# Patient Record
Sex: MALE | Race: WHITE | Employment: FULL TIME | URBAN - NONMETROPOLITAN AREA
[De-identification: names, ages, dates, MRNs, and addresses within clinical notes are randomized per-mention and may not be internally consistent; named-entity substitution may affect disease eponyms.]

---

## 2020-02-04 ENCOUNTER — HOSPITAL ENCOUNTER (INPATIENT)
Age: 40
LOS: 3 days | Discharge: HOME | DRG: 897 | End: 2020-02-08
Attending: INTERNAL MEDICINE | Admitting: INTERNAL MEDICINE
Payer: COMMERCIAL

## 2020-02-04 LAB
ACETAMINOPHEN LEVEL: < 5 UG/ML (ref 0–20)
ALBUMIN SERPL-MCNC: 4.7 G/DL (ref 3.5–5.1)
ALP BLD-CCNC: 88 U/L (ref 38–126)
ALT SERPL-CCNC: 150 U/L (ref 11–66)
AMPHETAMINE+METHAMPHETAMINE URINE SCREEN: NEGATIVE
ANION GAP SERPL CALCULATED.3IONS-SCNC: 17 MEQ/L (ref 8–16)
AST SERPL-CCNC: 231 U/L (ref 5–40)
BARBITURATE QUANTITATIVE URINE: NEGATIVE
BASOPHILS # BLD: 0.5 %
BASOPHILS ABSOLUTE: 0 THOU/MM3 (ref 0–0.1)
BENZODIAZEPINE QUANTITATIVE URINE: NEGATIVE
BILIRUB SERPL-MCNC: 0.9 MG/DL (ref 0.3–1.2)
BUN BLDV-MCNC: 8 MG/DL (ref 7–22)
CALCIUM SERPL-MCNC: 9.2 MG/DL (ref 8.5–10.5)
CANNABINOID QUANTITATIVE URINE: NEGATIVE
CHLORIDE BLD-SCNC: 98 MEQ/L (ref 98–111)
CO2: 24 MEQ/L (ref 23–33)
COCAINE METABOLITE QUANTITATIVE URINE: NEGATIVE
CREAT SERPL-MCNC: 0.7 MG/DL (ref 0.4–1.2)
EOSINOPHIL # BLD: 2.3 %
EOSINOPHILS ABSOLUTE: 0.1 THOU/MM3 (ref 0–0.4)
ERYTHROCYTE [DISTWIDTH] IN BLOOD BY AUTOMATED COUNT: 12.7 % (ref 11.5–14.5)
ERYTHROCYTE [DISTWIDTH] IN BLOOD BY AUTOMATED COUNT: 45.4 FL (ref 35–45)
ETHYL ALCOHOL, SERUM: 0.31 %
GFR SERPL CREATININE-BSD FRML MDRD: > 90 ML/MIN/1.73M2
GLUCOSE BLD-MCNC: 103 MG/DL (ref 70–108)
HCT VFR BLD CALC: 49.7 % (ref 42–52)
HEMOGLOBIN: 17 GM/DL (ref 14–18)
IMMATURE GRANS (ABS): 0.01 THOU/MM3 (ref 0–0.07)
IMMATURE GRANULOCYTES: 0.2 %
LYMPHOCYTES # BLD: 66.3 %
LYMPHOCYTES ABSOLUTE: 3.7 THOU/MM3 (ref 1–4.8)
MCH RBC QN AUTO: 33.2 PG (ref 26–33)
MCHC RBC AUTO-ENTMCNC: 34.2 GM/DL (ref 32.2–35.5)
MCV RBC AUTO: 97.1 FL (ref 80–94)
MONOCYTES # BLD: 7.2 %
MONOCYTES ABSOLUTE: 0.4 THOU/MM3 (ref 0.4–1.3)
NUCLEATED RED BLOOD CELLS: 0 /100 WBC
OPIATES, URINE: NEGATIVE
OSMOLALITY CALCULATION: 276.1 MOSMOL/KG (ref 275–300)
OXYCODONE: NEGATIVE
PHENCYCLIDINE QUANTITATIVE URINE: NEGATIVE
PLATELET # BLD: 149 THOU/MM3 (ref 130–400)
PMV BLD AUTO: 10.2 FL (ref 9.4–12.4)
POTASSIUM SERPL-SCNC: 3.8 MEQ/L (ref 3.5–5.2)
RBC # BLD: 5.12 MILL/MM3 (ref 4.7–6.1)
SALICYLATE, SERUM: < 0.3 MG/DL (ref 2–10)
SEG NEUTROPHILS: 23.5 %
SEGMENTED NEUTROPHILS ABSOLUTE COUNT: 1.3 THOU/MM3 (ref 1.8–7.7)
SODIUM BLD-SCNC: 139 MEQ/L (ref 135–145)
TOTAL PROTEIN: 8.8 G/DL (ref 6.1–8)
TSH SERPL DL<=0.05 MIU/L-ACNC: 0.99 UIU/ML (ref 0.4–4.2)
WBC # BLD: 5.6 THOU/MM3 (ref 4.8–10.8)

## 2020-02-04 PROCEDURE — 84443 ASSAY THYROID STIM HORMONE: CPT

## 2020-02-04 PROCEDURE — 36415 COLL VENOUS BLD VENIPUNCTURE: CPT

## 2020-02-04 PROCEDURE — 80053 COMPREHEN METABOLIC PANEL: CPT

## 2020-02-04 PROCEDURE — 93005 ELECTROCARDIOGRAM TRACING: CPT | Performed by: INTERNAL MEDICINE

## 2020-02-04 PROCEDURE — 85025 COMPLETE CBC W/AUTO DIFF WBC: CPT

## 2020-02-04 PROCEDURE — 99285 EMERGENCY DEPT VISIT HI MDM: CPT

## 2020-02-04 PROCEDURE — G0480 DRUG TEST DEF 1-7 CLASSES: HCPCS

## 2020-02-04 PROCEDURE — 80307 DRUG TEST PRSMV CHEM ANLYZR: CPT

## 2020-02-05 PROBLEM — F10.929 ALCOHOL INTOXICATION (HCC): Status: ACTIVE | Noted: 2020-02-05

## 2020-02-05 LAB
EKG ATRIAL RATE: 81 BPM
EKG P AXIS: 76 DEGREES
EKG P-R INTERVAL: 132 MS
EKG Q-T INTERVAL: 370 MS
EKG QRS DURATION: 90 MS
EKG QTC CALCULATION (BAZETT): 429 MS
EKG R AXIS: 92 DEGREES
EKG T AXIS: 72 DEGREES
EKG VENTRICULAR RATE: 81 BPM
ETHYL ALCOHOL, SERUM: 0.06 %
MAGNESIUM: 1.8 MG/DL (ref 1.6–2.4)

## 2020-02-05 PROCEDURE — 1200000003 HC TELEMETRY R&B

## 2020-02-05 PROCEDURE — 2580000003 HC RX 258: Performed by: INTERNAL MEDICINE

## 2020-02-05 PROCEDURE — 6370000000 HC RX 637 (ALT 250 FOR IP): Performed by: EMERGENCY MEDICINE

## 2020-02-05 PROCEDURE — 36415 COLL VENOUS BLD VENIPUNCTURE: CPT

## 2020-02-05 PROCEDURE — 6360000002 HC RX W HCPCS: Performed by: EMERGENCY MEDICINE

## 2020-02-05 PROCEDURE — 99223 1ST HOSP IP/OBS HIGH 75: CPT | Performed by: INTERNAL MEDICINE

## 2020-02-05 PROCEDURE — 2580000003 HC RX 258: Performed by: EMERGENCY MEDICINE

## 2020-02-05 PROCEDURE — 83735 ASSAY OF MAGNESIUM: CPT

## 2020-02-05 PROCEDURE — 6360000002 HC RX W HCPCS: Performed by: INTERNAL MEDICINE

## 2020-02-05 PROCEDURE — 2500000003 HC RX 250 WO HCPCS: Performed by: EMERGENCY MEDICINE

## 2020-02-05 PROCEDURE — 6370000000 HC RX 637 (ALT 250 FOR IP): Performed by: INTERNAL MEDICINE

## 2020-02-05 PROCEDURE — 94760 N-INVAS EAR/PLS OXIMETRY 1: CPT

## 2020-02-05 PROCEDURE — 93005 ELECTROCARDIOGRAM TRACING: CPT | Performed by: EMERGENCY MEDICINE

## 2020-02-05 PROCEDURE — G0480 DRUG TEST DEF 1-7 CLASSES: HCPCS

## 2020-02-05 PROCEDURE — 2709999900 HC NON-CHARGEABLE SUPPLY

## 2020-02-05 RX ORDER — PHENOBARBITAL 32.4 MG/1
64.8 TABLET ORAL 2 TIMES DAILY
Status: COMPLETED | OUTPATIENT
Start: 2020-02-06 | End: 2020-02-06

## 2020-02-05 RX ORDER — ONDANSETRON 2 MG/ML
4 INJECTION INTRAMUSCULAR; INTRAVENOUS EVERY 6 HOURS PRN
Status: DISCONTINUED | OUTPATIENT
Start: 2020-02-05 | End: 2020-02-08 | Stop reason: HOSPADM

## 2020-02-05 RX ORDER — PHENOBARBITAL 32.4 MG/1
32.4 TABLET ORAL 2 TIMES DAILY
Status: COMPLETED | OUTPATIENT
Start: 2020-02-07 | End: 2020-02-08

## 2020-02-05 RX ORDER — THIAMINE MONONITRATE (VIT B1) 100 MG
100 TABLET ORAL DAILY
Status: DISCONTINUED | OUTPATIENT
Start: 2020-02-05 | End: 2020-02-08 | Stop reason: HOSPADM

## 2020-02-05 RX ORDER — MULTIVITAMIN WITH FOLIC ACID 400 MCG
1 TABLET ORAL DAILY
Status: DISCONTINUED | OUTPATIENT
Start: 2020-02-05 | End: 2020-02-08 | Stop reason: HOSPADM

## 2020-02-05 RX ORDER — FOLIC ACID 1 MG/1
1 TABLET ORAL DAILY
Status: DISCONTINUED | OUTPATIENT
Start: 2020-02-05 | End: 2020-02-08 | Stop reason: HOSPADM

## 2020-02-05 RX ORDER — SODIUM CHLORIDE 0.9 % (FLUSH) 0.9 %
10 SYRINGE (ML) INJECTION EVERY 12 HOURS SCHEDULED
Status: DISCONTINUED | OUTPATIENT
Start: 2020-02-05 | End: 2020-02-08 | Stop reason: HOSPADM

## 2020-02-05 RX ORDER — SODIUM CHLORIDE 9 MG/ML
INJECTION, SOLUTION INTRAVENOUS CONTINUOUS
Status: DISCONTINUED | OUTPATIENT
Start: 2020-02-05 | End: 2020-02-06

## 2020-02-05 RX ORDER — SODIUM CHLORIDE 0.9 % (FLUSH) 0.9 %
10 SYRINGE (ML) INJECTION PRN
Status: DISCONTINUED | OUTPATIENT
Start: 2020-02-05 | End: 2020-02-08 | Stop reason: HOSPADM

## 2020-02-05 RX ORDER — LORAZEPAM 1 MG/1
1 TABLET ORAL ONCE
Status: COMPLETED | OUTPATIENT
Start: 2020-02-05 | End: 2020-02-05

## 2020-02-05 RX ADMIN — PHENOBARBITAL SODIUM 291.85 MG: 65 INJECTION INTRAMUSCULAR; INTRAVENOUS at 23:05

## 2020-02-05 RX ADMIN — THERA TABS 1 TABLET: TAB at 16:39

## 2020-02-05 RX ADMIN — Medication 100 MG: at 16:39

## 2020-02-05 RX ADMIN — FOLIC ACID 1 MG: 1 TABLET ORAL at 16:39

## 2020-02-05 RX ADMIN — FOLIC ACID: 5 INJECTION, SOLUTION INTRAMUSCULAR; INTRAVENOUS; SUBCUTANEOUS at 10:57

## 2020-02-05 RX ADMIN — PHENOBARBITAL SODIUM 291.85 MG: 65 INJECTION INTRAMUSCULAR; INTRAVENOUS at 16:42

## 2020-02-05 RX ADMIN — LORAZEPAM 1 MG: 1 TABLET ORAL at 09:59

## 2020-02-05 ASSESSMENT — SLEEP AND FATIGUE QUESTIONNAIRES
SLEEP PATTERN: DIFFICULTY FALLING ASLEEP
DIFFICULTY STAYING ASLEEP: YES
AVERAGE NUMBER OF SLEEP HOURS: 4
DIFFICULTY FALLING ASLEEP: YES
RESTFUL SLEEP: YES
DO YOU HAVE DIFFICULTY SLEEPING: YES
DO YOU USE A SLEEP AID: NO
DIFFICULTY ARISING: NO

## 2020-02-05 ASSESSMENT — PAIN SCALES - GENERAL
PAINLEVEL_OUTOF10: 0
PAINLEVEL_OUTOF10: 0

## 2020-02-05 NOTE — ED PROVIDER NOTES
Patient was seen and evaluated in the emergency department, patient appeared to be in no acute distress. Patient was seen and evaluated previously by Magalie Buckner CNP, from whom I took over this case and patient care. I have personally performed and/or participated in the history, exam and medical decision making and agree with all pertinent clinical information as noted by the previous provider. I have also reviewed and agree with the past medical, family and social history unless otherwise noted. I have personally performed a face-to-face diagnostic evaluation on this patient. I have reviewed the previous provider's orders for imaging and labs as well as the previous provider's documentation. 0600 assumed patient care at this time due to shift change. Patient presented to the emergency department complaining of suicidal thoughts. Patient states that he has a plan but would not tell staff members. Patient was intoxicated with alcohol at the time of the initial evaluation. The patient is pending an alcohol redraw at 8:00 AM this morning. After sobriety is achieved to be re-evaluated by behavioral access team members. Patient remains in the safe room area for observation. The patient's alcohol redraw was 0.06%. Patient was reevaluated. The patient is becoming shaky, tremorous. Patient states that he has not stopped drinking alcohol since June of last year. Patient states that he drinks at least 1 L of vodka per day. Patient states that he has had withdrawal symptoms when trying to stop drinking alcohol in the past.    Patient was treated with Ativan 1 mg oral.  He was advised to be brought to the medical room, placed on the heart monitor and IV started. An EKG was performed. Normal sinus rhythm with no acute changes. Patient is monitored with telemetry. IV fluids with multivitamin, thiamine was initiated. I contacted the hospitalist due to the patient's withdrawal symptoms.   The

## 2020-02-05 NOTE — H&P
[facility]) [F10.929] 02/05/2020    Alcohol withdrawal syndrome with complication ([facility] [R94.061] 02/05/2020    Suicidal behavior without attempted self-injury [R46.89] 02/05/2020    Alcohol withdrawal syndrome, uncomplicated ([facility] [D98.985] 02/05/2020       PLAN:    1. Alcohol Withdrawal, without Delirium: phenobarbital protocol. Folic Acid, Thiamine, Daily multivitamin. IV fluids. 2. Suicidal Behavior: sitter at bedside. Consult Psychiatry. Thank you No primary care provider on file. for the opportunity to be involved in this patient's care.     Electronically signed by Edith Morgan MD on 2/5/2020 at 10:39 AM

## 2020-02-05 NOTE — ED NOTES
ED nurse-to-nurse bedside report    Chief Complaint   Patient presents with    Suicidal      LOC: alert and orientated to name, place, date  Vital signs   Vitals:    02/05/20 0402 02/05/20 0817 02/05/20 0955 02/05/20 1037   BP: (!) 157/95 (!) 166/94 (!) 166/99    Pulse: 84 100 87 88   Resp: 16 18 18 22   Temp:       TempSrc:       SpO2: 94% 95% 94% 96%   Weight:    130 lb (59 kg)      Pain:    Pain Interventions: none  Pain Goal: 0  Oxygen: No    Current needs required none   Telemetry: Yes  LDAs:   Peripheral IV 02/05/20 Left Antecubital (Active)   Site Assessment Clean;Dry; Intact 2/5/2020 10:30 AM   Dressing Status Clean;Dry; Intact 2/5/2020 10:30 AM     Continuous Infusions:   Mobility: Independent  Staples Fall Risk Score: No flowsheet data found. Fall Interventions:   Report given to: [facility], RN    Sitter at bedside.         Abdiaziz Harris RN  02/05/20 2997

## 2020-02-05 NOTE — ED NOTES
Upon first contact with patient this RN receives bedside shift report from [employer]. Patient resting in bed with eyes closed.       Raeann Ye RN  02/05/20 8644

## 2020-02-05 NOTE — ED NOTES
Pt resting quietly in room no needs expressed. Pt in safe room in a ligature resistant environment. Pt has continuous campus police supervision in safe room. Will continue to monitor.        Leydi Estrella RN  02/05/20 5371

## 2020-02-05 NOTE — PROGRESS NOTES
[facility] Team notified of results of the C-SSRS screening and the need for further evaluation of patient. Discussed suicide precautions with patient before implementation. Patient notified that they will be observed at all times, including toileting/bathing. Visitors will be screened and may be limited at the discretion of the nurse. Visitor belongings are subject to be searched. Belongings may not be allowed into the patient room. Personal belongings checked by Gregorio Metzger RN and Marely Lopez in the presence of the patient. Belongings believed to be a threat to patient safety removed from room. Belongings removed include: hat, knife, pills, eye drops, wallet, keys, cigarettes, cell phone  Placed in secure area in cabinet in room. [facility] Room screened for safety, items removed to create a safe environment include:   Blood pressure cuff   Loose or extra cords, tubing (not of medical necessity)   Extra Linens   Telephone   Toiletries   Trash liners   Patient's cell phone and charging cord (must be removed from room)       Safety tray ordered: yes    Expectations were discussed with sitter (unit support aide). Sitter positioned near exit. Sitter reminded that patient should be observed at all times including toileting and bathing. Sitter has security radio and documentation sheet. Patient and sitter included in hourly rounds.

## 2020-02-05 NOTE — PROGRESS NOTES
who requests that we hold off on calling psychiatry as he would like to be sure patient is medically cleared and not needing admission to a medical unit. Will await word from Hamzah before proceeding. Updated Level of Care Disposition:      Patient care handover to [facility] clinician [address] for final disposition.

## 2020-02-05 NOTE — ED NOTES
Patient resting in bed. Respirations easy and unlabored. No distress noted. Suicide precautions maintained. Breakfast provided to patient.       Ghazala Bello RN  02/05/20 3637

## 2020-02-05 NOTE — ED NOTES
Patient transported to [facility]  in stable condition. [place] police to escort.          Lexis Baer, SAUL  85/11/25 4348

## 2020-02-05 NOTE — ED PROVIDER NOTES
[facility] EMERGENCY DEPT      CHIEF COMPLAINT       Chief Complaint   Patient presents with    Suicidal       Nurses Notes reviewed and I agree except as noted in the HPI. HISTORY OF PRESENT ILLNESS    Dwight Gutierrez is a 46 y.o. male who presents for Depression with suicidal ideation. Patient states he has been severely depressed for the past few days and is scared he is going to hurt himself. He has a plan but would not discuss it with me. He has had previous attempts. He denies visual or auditory hallucinations. He denies head injury. He has had a mild headache for the past few days. He does not eat well but drinks lots of water. He endorses smoking two packs of cigarettes a day, drinking a liter of vodka a day, but denies drug use. He has had withdrawal symptoms in the past consisting of tremors but no seizures. REVIEW OF SYSTEMS     Review of Systems   Constitutional: Negative for appetite change and fever. HENT: Negative for congestion. Eyes: Negative for photophobia. Respiratory: Negative for cough and shortness of breath. Cardiovascular: Negative for chest pain. Gastrointestinal: Negative for abdominal pain, nausea and vomiting. Genitourinary: Negative for difficulty urinating. Musculoskeletal: Negative for gait problem. Skin: Negative for rash and wound. Neurological: Positive for headaches. Psychiatric/Behavioral: Positive for dysphoric mood and suicidal ideas. Negative for hallucinations. PAST MEDICAL HISTORY    has a past medical history of Depressed and Panic disorder with agoraphobia. SURGICAL HISTORY      has a past surgical history that includes Mouth surgery (Bilateral, 10/2008).     CURRENT MEDICATIONS       Previous Medications    DIAZEPAM (VALIUM) 10 MG TABLET    Take 1 tablet by mouth every 6 hours as needed for Anxiety (withdrawal symptoms)    ONDANSETRON (ZOFRAN ODT) 4 MG DISINTEGRATING TABLET    Take 1 tablet by mouth every 8 hours as needed for Nausea or Vomiting       ALLERGIES     has No Known Allergies. FAMILY HISTORY     He indicated that his mother is alive. He indicated that his father is alive. He indicated that the status of his maternal grandmother is unknown. He indicated that the status of his maternal uncle is unknown.   family history includes Alcohol Abuse in his father; Cancer in his maternal grandmother; High Blood Pressure in his maternal uncle; High Cholesterol in his maternal uncle; Substance Abuse in his father. SOCIAL HISTORY    reports that he has been smoking cigarettes. He has a 40.00 pack-year smoking history. He does not have any smokeless tobacco history on file. He reports current alcohol use. He reports that he does not use drugs. PHYSICAL EXAM     INITIAL VITALS:  oral temperature is 98.5 °F (36.9 °C). His blood pressure is 177/105 (abnormal) and his pulse is 90. His respiration is 18 and oxygen saturation is 93%. Physical Exam  Vitals signs and nursing note reviewed. Constitutional:       General: He is not in acute distress. Appearance: He is well-developed. He is not toxic-appearing or diaphoretic. HENT:      Head: Normocephalic and atraumatic. Right Ear: Hearing normal.      Left Ear: Hearing normal.      Nose: Nose normal. No rhinorrhea. Mouth/Throat:      Pharynx: Uvula midline. No oropharyngeal exudate. Eyes:      General: Lids are normal. No scleral icterus. Conjunctiva/sclera:      Right eye: Right conjunctiva is injected. Left eye: Left conjunctiva is injected. Pupils: Pupils are equal, round, and reactive to light. Neck:      Musculoskeletal: Normal range of motion and neck supple. No neck rigidity. Trachea: No tracheal deviation. Cardiovascular:      Rate and Rhythm: Normal rate and regular rhythm. Heart sounds: Normal heart sounds. No murmur. Pulmonary:      Effort: Pulmonary effort is normal. No respiratory distress.       Breath sounds: Normal breath sounds. No stridor. No decreased breath sounds or wheezing. Abdominal:      General: There is no distension. Palpations: Abdomen is soft. Abdomen is not rigid. Tenderness: There is no abdominal tenderness. There is no guarding. Musculoskeletal: Normal range of motion. Lymphadenopathy:      Cervical: No cervical adenopathy. Skin:     General: Skin is warm and dry. Coloration: Skin is not pale. Findings: No rash. Neurological:      Mental Status: He is alert and oriented to person, place, and time. GCS: GCS eye subscore is 4. GCS verbal subscore is 5. GCS motor subscore is 6. Gait: Gait normal.      Comments: No gross deficits observed   Psychiatric:         Attention and Perception: Attention normal.         Speech: Speech normal.         Behavior: Behavior normal.         Thought Content: Thought content includes suicidal ideation. Thought content includes suicidal plan. Cognition and Memory: Memory normal.      Comments: The patient is tearful         DIFFERENTIAL DIAGNOSIS:   Including but not limited to: Suicidal ideation, depression, alcoholism, alcohol intoxication, metabolic derangement, bipolar disorder    DIAGNOSTIC RESULTS     EKG: All EKG's are [facility] who either signs or Co-signs this chart in the absence of a cardiologist.  Normal sinus rhythm at 81 bpm, NV interval 132, QRS duration 90, QTc 429, P-R-T axes 76, 92, and 72. No STEMI. RADIOLOGY: non-plain film images(s) such as CT,Ultrasound and MRI are read by the radiologist.  Plain radiographic images are visualized and preliminarily interpreted by the emergency physician unless otherwise stated below.   No orders to display       LABS:   Labs Reviewed   CBC WITH AUTO DIFFERENTIAL - Abnormal; Notable for the following components:       Result Value    MCV 97.1 (*)     MCH 33.2 (*)     RDW-SD 45.4 (*)     Segs Absolute 1.3 (*)     All other components within normal limits   COMPREHENSIVE METABOLIC PANEL - Abnormal; Notable for the following components:     (*)     Total Protein 8.8 (*)      (*)     All other components within normal limits   SALICYLATE LEVEL - Abnormal; Notable for the following components:    Salicylate, Serum < 0.3 (*)     All other components within normal limits   ANION GAP - Abnormal; Notable for the following components:    Anion Gap 17.0 (*)     All other components within normal limits   TSH WITH REFLEX   ETHANOL   URINE DRUG SCREEN   ACETAMINOPHEN LEVEL   GLOMERULAR FILTRATION RATE, ESTIMATED   OSMOLALITY       EMERGENCY DEPARTMENT COURSE:   Vitals:    Vitals:    02/04/20 2123   BP: (!) 177/105   Pulse: 90   Resp: 18   Temp: 98.5 °F (36.9 °C)   TempSrc: Oral   SpO2: 93%         MDM:  The patient was seen by me in the emergency department for depression with suicidal ideation. The patient was found intoxicated but cooperative. Physical exam was otherwise benign. Appropriate labs were ordered. Vital signs noted patient to be hypertensive but were otherwise stable. Labs reflected elevated liver enzymes and a blood alcohol of .31. Patients redraw time was estimated at 8 AM. I discussed this patient with shabbir from [facility]. Patient was turned over to Hugo Medina NP at change of shift pending sobriety and evaluation by RACHELLE. Disposition most likely will be admission. CRITICAL CARE:   None    CONSULTS:  Shabbir from [facility]    PROCEDURES:  None    FINAL IMPRESSION      1. Acute alcoholic intoxication without complication ([address]    2. Depression with suicidal ideation    3. Elevated liver function tests          DISPOSITION/PLAN     1. Acute alcoholic intoxication without complication ([address]    2. Depression with suicidal ideation    3.  Elevated liver function tests    Pending      (Please note that portions of this note were completed with a voice recognition program.  Efforts were made to edit the dictations but occasionally words are

## 2020-02-05 NOTE — ED NOTES
Pt given turkey sandwich upon request. Pt denies needs at this time. Will continue to monitor.      Tito Dance  02/04/20 0079

## 2020-02-05 NOTE — ED NOTES
Pt in safe room in a ligature resistant environment. Pt resting with eyes closed. Pt has continuous campus police supervision in safe room.        Inga Mccollum RN  02/05/20 7879

## 2020-02-06 LAB
BACTERIA: ABNORMAL
BILIRUBIN URINE: NEGATIVE
BLOOD, URINE: NEGATIVE
CASTS: ABNORMAL /LPF
CASTS: ABNORMAL /LPF
CHARACTER, URINE: CLEAR
COLOR: ABNORMAL
CRYSTALS: ABNORMAL
EKG ATRIAL RATE: 71 BPM
EKG P AXIS: 38 DEGREES
EKG P-R INTERVAL: 120 MS
EKG Q-T INTERVAL: 384 MS
EKG QRS DURATION: 88 MS
EKG QTC CALCULATION (BAZETT): 417 MS
EKG R AXIS: 86 DEGREES
EKG T AXIS: 77 DEGREES
EKG VENTRICULAR RATE: 71 BPM
EPITHELIAL CELLS, UA: ABNORMAL /HPF
GLUCOSE, URINE: NEGATIVE MG/DL
KETONES, URINE: ABNORMAL
LEUKOCYTE ESTERASE, URINE: NEGATIVE
MISCELLANEOUS LAB TEST RESULT: ABNORMAL
NITRITE, URINE: NEGATIVE
PH UA: 8.5 (ref 5–9)
PROTEIN UA: NEGATIVE MG/DL
RBC URINE: ABNORMAL /HPF
RENAL EPITHELIAL, UA: ABNORMAL
SPECIFIC GRAVITY UA: 1.02 (ref 1–1.03)
UROBILINOGEN, URINE: 1 EU/DL (ref 0–1)
WBC UA: ABNORMAL /HPF
YEAST: ABNORMAL

## 2020-02-06 PROCEDURE — 1200000003 HC TELEMETRY R&B

## 2020-02-06 PROCEDURE — 6370000000 HC RX 637 (ALT 250 FOR IP): Performed by: INTERNAL MEDICINE

## 2020-02-06 PROCEDURE — 6360000002 HC RX W HCPCS: Performed by: INTERNAL MEDICINE

## 2020-02-06 PROCEDURE — 2709999900 HC NON-CHARGEABLE SUPPLY

## 2020-02-06 PROCEDURE — 2580000003 HC RX 258: Performed by: INTERNAL MEDICINE

## 2020-02-06 PROCEDURE — 90792 PSYCH DIAG EVAL W/MED SRVCS: CPT | Performed by: PSYCHIATRY & NEUROLOGY

## 2020-02-06 PROCEDURE — 81001 URINALYSIS AUTO W/SCOPE: CPT

## 2020-02-06 PROCEDURE — 99232 SBSQ HOSP IP/OBS MODERATE 35: CPT | Performed by: INTERNAL MEDICINE

## 2020-02-06 RX ORDER — SODIUM CHLORIDE 9 MG/ML
INJECTION, SOLUTION INTRAVENOUS CONTINUOUS
Status: DISCONTINUED | OUTPATIENT
Start: 2020-02-06 | End: 2020-02-06

## 2020-02-06 RX ORDER — NICOTINE 21 MG/24HR
1 PATCH, TRANSDERMAL 24 HOURS TRANSDERMAL DAILY
Status: DISCONTINUED | OUTPATIENT
Start: 2020-02-06 | End: 2020-02-06

## 2020-02-06 RX ADMIN — PHENOBARBITAL SODIUM 291.85 MG: 65 INJECTION INTRAMUSCULAR; INTRAVENOUS at 03:45

## 2020-02-06 RX ADMIN — PHENOBARBITAL 64.8 MG: 32.4 TABLET ORAL at 09:35

## 2020-02-06 RX ADMIN — Medication 1 PUFF: at 21:58

## 2020-02-06 RX ADMIN — SODIUM CHLORIDE: 9 INJECTION, SOLUTION INTRAVENOUS at 09:35

## 2020-02-06 RX ADMIN — SODIUM CHLORIDE: 9 INJECTION, SOLUTION INTRAVENOUS at 02:52

## 2020-02-06 RX ADMIN — FOLIC ACID 1 MG: 1 TABLET ORAL at 09:35

## 2020-02-06 RX ADMIN — Medication 100 MG: at 09:35

## 2020-02-06 RX ADMIN — Medication 1 PUFF: at 17:29

## 2020-02-06 RX ADMIN — THERA TABS 1 TABLET: TAB at 09:35

## 2020-02-06 RX ADMIN — Medication 1 PUFF: at 15:14

## 2020-02-06 RX ADMIN — PHENOBARBITAL 64.8 MG: 32.4 TABLET ORAL at 21:57

## 2020-02-06 RX ADMIN — SODIUM CHLORIDE, PRESERVATIVE FREE 10 ML: 5 INJECTION INTRAVENOUS at 21:58

## 2020-02-06 ASSESSMENT — ENCOUNTER SYMPTOMS
NAUSEA: 0
SHORTNESS OF BREATH: 0
PHOTOPHOBIA: 0
ABDOMINAL PAIN: 0
VOMITING: 0
COUGH: 0

## 2020-02-06 ASSESSMENT — PAIN SCALES - GENERAL
PAINLEVEL_OUTOF10: 0

## 2020-02-06 NOTE — PROGRESS NOTES
Hospitalist Progress Note    Patient:  Daev Romero  YOB: 1980  MRN: [id number]   PCP: No primary care provider on file. Acct: [de-identified]  Unit/Bed: 8B-27/027-A    Date of Admission: 2/4/2020      ASSESSMENT     1. Suicidal ideation in setting of chronic depression: Currently has sitter at bedside. Psychiatry consult pending  2. Uncomplicated alcohol withdrawal in setting of history of chronic alcoholism: Phenobarbital protocol. [employer]  3. Volume depletion: IV fluids  4. Panic disorder    PLAN     1. Psychiatry to evaluate. Patient currently denies further suicidal ideation,  2. Continue phenobarbital protocol  3. Continue to monitor    Anticipated Discharge in : after phenobarbital protocol completed     Code Status: Full Code    Electronically signed by Jose Luis Pierce MD on 2/6/2020 at 1:21 PM      Chief Complaint     Suicidal ideation. SUBJECTIVE     The patient is a 46 y.o.. male w/ a history of chronic depression and chronic alcoholism who was admitted for suicidal ideation.    - Patient currently denies suicidal ideation.  No other acute issues      OBJECTIVE     Medications:  Reviewed    Infusion Medications   Scheduled Medications    sodium chloride flush  10 mL Intravenous 2 times per day    enoxaparin  40 mg Subcutaneous Daily    folic acid  1 mg Oral Daily    thiamine  100 mg Oral Daily    multivitamin  1 tablet Oral Daily    PHENobarbital  64.8 mg Oral BID    Followed by   Christina Avalos ON 2/7/2020] PHENobarbital  32.4 mg Oral BID     PRN Meds: nicotine, sodium chloride flush, magnesium hydroxide, ondansetron    Ins and outs:      Intake/Output Summary (Last 24 hours) at 2/6/2020 1321  Last data filed at 2/6/2020 0939  Gross per 24 hour   Intake 2678.34 ml   Output 650 ml   Net 2028.34 ml       Physical Examination     BP (!) 159/97   Pulse 80   Temp 98.1 °F (36.7 °C) (Oral)   Resp 16   Ht 5' 10\" (1.778 m)   Wt 136 lb 1.6 oz (61.7 kg)   SpO2 97%   BMI 19.53 kg/m² General appearance: No apparent distress. HEENT: Extraocular motion intact. Neck: Supple  Respiratory:  CTA bilaterally without rales/wheezes/rhonchi. Cardiovascular: RRR with normal S1/S2 without murmurs, rubs or gallops. Abdomen: Soft, non-tender, non-distended with normal bowel sounds. Musculoskeletal: Patient is moving extremities x 4 spontaneously  Neurologic: Grossly non focal. CN: II-XII intact  Psychiatric: Alert and oriented  Vascular: Dorsalis pedis palpable bilaterally. Radial pulses palpable bilaterally. No peripheral edema   Skin:  No visible rashes or lesions. Labs     Recent Labs     02/04/20 2149   WBC 5.6   HGB 17.0   HCT 49.7        Recent Labs     02/04/20 2149      K 3.8   CL 98   CO2 24   BUN 8   CREATININE 0.7   CALCIUM 9.2     Recent Labs     02/04/20 2149   *   *   BILITOT 0.9   ALKPHOS 88     No results for input(s): INR in the last 72 hours. No results for input(s): Marie Medici in the last 72 hours. Urinalysis:      Lab Results   Component Value Date    NITRU NEGATIVE 02/06/2020    WBCUA NONE SEEN 02/06/2020    BACTERIA NONE SEEN 02/06/2020    RBCUA 0-2 02/06/2020    BLOODU NEGATIVE 02/06/2020    SPECGRAV 1.019 02/06/2020    GLUCOSEU 500 12/10/2015       Diagnostic imaging/procedures       No orders to display       No results found.     DVT prophylaxis: [x] Lovenox                                 [] SCDs                                 [] SQ Heparin                                 [] Encourage ambulation           [] Already on Anticoagulation     Disposition:    [x] TBD

## 2020-02-06 NOTE — PROGRESS NOTES
Reinforced suicide precautions with patient. Reinforced that visitors will be screened and may be limited at the discretion of the nurse. Visitor belongings are subject to be searched. Belongings may not be allowed into the patient room. Reviewed any personal belongings from the previous shift believed to be a threat to patient safety removed from room. Belongings removed include: hat, knife, pills, eye drops, walet, keys, cigarettes, cell phone. Placed in secure area in cabinet in room . Room screened for safety, items removed to create a safe environment include:   Blood pressure cuff   Loose or extra cords, tubing (not of medical necessity)   Extra Linens   Telephone   Toiletries   Trash Liners   Patient's cell phone and charging cord (must be removed from room)      Safety tray ordered: yes    Expectations were discussed with sitter (unit support aide). Sitter positioned near exit. Sitter reminded that patient should be observed at all times including toileting and bathing. Sitter has security radio and documentation sheet. Patient and sitter included in hourly rounds.

## 2020-02-06 NOTE — CONSULTS
Department of Psychiatry  Consult Service   Psychiatric Assessment      Thank you very much for allowing us to participate in the care of this patient. Reason for Consult:  Alcohol detoxification, suicidal ideation     HISTORY OF PRESENT ILLNESS:          The patient is a 46 y.o. male with significant history of depression, anxiety and alcohol dependence who is admitted medically for alcohol withdrawal management. He was experiencing shaking and sweating. Per the ED, the patient was actively suicidal with a plan but did not want to talk about his plan. SW note stated that he had a plan to \"bleed out after cutting off his penis. \"     Aledra Andres states he has been drinking alcohol daily on and off for years. \"The most recent stem started in July. \" \"Most of the big bottles of grocery store vodka. \" Reports it takes him less than 2 days to finish a bottle. Reports he has experienced alcohol withdrawals in the past. States that he got \"really bad shakes\" upon [facility] to [facility]. Denies current withdrawal symptoms. He has never tried inpatient detoxification or rehabilitation for his alcohol use. Patient has been taking larger amount of alcohol  Ale Yovanyjaney has desire to cut down drinking  Patient has feelings of guilt, related to drinking alcohol  Patient has been spending time in getting alcohol and using alcohol  Patient has cravings for alcohol. Recurrent use of alcohol has affected patient social life and relationships  Patient also has developed tolerance to alcohol. Patient reports that he has not been feeling suicidal the past day or so. Prior to his admission, \"thoughts were really bad. That nothing would ever get better. \" \"mariya always wanted to go through gender reassignment surgery. I wanted to just cut it off myself at the time but I knew if I did that there would be a chance that Id bleed out and die. \" \"I called 911 because I was having those thoughts and wanted help. \" He endorses that he was actively suicidal but gallops. Abdomen: Soft, non-tender, non-distended with normal bowel sounds. Musculoskeletal: Patient is moving extremities x 4 spontaneously  Neurologic: Grossly non focal. CN: II-XII intact  Psychiatric: Alert and oriented  Vascular: Dorsalis pedis palpable bilaterally. Radial pulses palpable bilaterally. No peripheral edema   Skin:  No visible rashes or lesions    Mental Status Examination:  Level of consciousness:  Within normal limits  Appearance: hospital attire, lying in bed, fair grooming  Behavior/Motor:  no abnormalities noted  Attitude toward examiner:  cooperative, attentive and good eye contact  Speech:  Spontaneous, normal rate and volume  Mood:  Dysthymic  Affect: mood congruent  Thought processes:  Linear, goal directed, coherent  Thought content: Denies suicidal ideations   Denies homicidal ideations    Denies hallucinations   No evidence of delusions  Cognition:  Oriented to self, situation, location, date  Concentration clinically adequate  Memory age appropriate  Insight & Judgment:  fair    DSM-5 DIAGNOSIS:      Alcohol use disorder, severe, with dependence  Moderate episode of recurrent major depressive disorder  Generalized anxiety disorder    General Medical Condition  Patient Active Problem List   Diagnosis Code    Alcohol dependence with physiological dependence ([address] F10.20    Depression, major, recurrent, moderate ([address] F33.1    Panic disorder with agoraphobia F40.01    Alcohol intoxication ([address] F10.929    Alcohol withdrawal syndrome with complication ([address] D41.242    Suicidal behavior without attempted self-injury R46.89    Alcohol withdrawal syndrome, uncomplicated ([address] U73.044       Stressors     Severity of stressors is  moderate  Source of stressors include:   Other psychosocial and environmental stressors    PLAN:    Discontinue 1:1 sitter  Continue phenobarbitol protocol for alcohol withdrawal management  Patient can be discharged home once medically stable from a psych perspective  Patient to follow up with OP psychiatry at [facility]      Thank you very much for allowing us to participate in the care of this patient. Time spent 45 min.       Electronically signed by Rogelio Rausch MD on 2/6/20 at 9:40 PM

## 2020-02-07 PROCEDURE — 1200000003 HC TELEMETRY R&B

## 2020-02-07 PROCEDURE — 99232 SBSQ HOSP IP/OBS MODERATE 35: CPT | Performed by: INTERNAL MEDICINE

## 2020-02-07 PROCEDURE — 2580000003 HC RX 258: Performed by: INTERNAL MEDICINE

## 2020-02-07 PROCEDURE — 6370000000 HC RX 637 (ALT 250 FOR IP): Performed by: INTERNAL MEDICINE

## 2020-02-07 RX ADMIN — Medication 1 PUFF: at 12:35

## 2020-02-07 RX ADMIN — PHENOBARBITAL 32.4 MG: 32.4 TABLET ORAL at 09:56

## 2020-02-07 RX ADMIN — FOLIC ACID 1 MG: 1 TABLET ORAL at 09:56

## 2020-02-07 RX ADMIN — SODIUM CHLORIDE, PRESERVATIVE FREE 10 ML: 5 INJECTION INTRAVENOUS at 21:12

## 2020-02-07 RX ADMIN — PHENOBARBITAL 32.4 MG: 32.4 TABLET ORAL at 21:10

## 2020-02-07 RX ADMIN — Medication 100 MG: at 09:56

## 2020-02-07 RX ADMIN — THERA TABS 1 TABLET: TAB at 09:56

## 2020-02-07 RX ADMIN — SODIUM CHLORIDE, PRESERVATIVE FREE 10 ML: 5 INJECTION INTRAVENOUS at 10:01

## 2020-02-07 ASSESSMENT — PAIN SCALES - GENERAL
PAINLEVEL_OUTOF10: 0
PAINLEVEL_OUTOF10: 0

## 2020-02-07 NOTE — CARE COORDINATION
2/7/20, 12:50 PM    DISCHARGE ONGOING EVALUATION:     Archin Sonali day: 2  Location: [facility]27/027-A Reason for admit: Alcohol withdrawal syndrome, uncomplicated [facility] [D38.122]   Treatment Plan of Care: Revisit with pt today. He is pleasant and talkative with this CM. He states he would like to get established with the [facility]) in [place]. Appt scheduled for pt for Feb 14 with Casie Brambila CNP. Barriers to Discharge: Medical readiness. PCP: No primary care provider on file.   Readmission Risk Score: 8%

## 2020-02-07 NOTE — PROGRESS NOTES
Physical Examination     BP (!) 142/91   Pulse 76   Temp 98.8 °F (37.1 °C) (Oral)   Resp 16   Ht 5' 10\" (1.778 m)   Wt 138 lb 4 oz (62.7 kg)   SpO2 95%   BMI 19.84 kg/m²     General appearance: No apparent distress. HEENT: Extraocular motion intact. Neck: Supple  Respiratory:  CTA bilaterally without rales/wheezes/rhonchi. Cardiovascular: RRR with normal S1/S2 without murmurs, rubs or gallops. Abdomen: Soft, non-tender, non-distended with normal bowel sounds. Musculoskeletal: Patient is moving extremities x 4 spontaneously  Neurologic: Grossly non focal. CN: II-XII intact  Psychiatric: Alert and oriented  Vascular: Dorsalis pedis palpable bilaterally. Radial pulses palpable bilaterally. No peripheral edema   Skin:  No visible rashes or lesions. Labs     Recent Labs     02/04/20 2149   WBC 5.6   HGB 17.0   HCT 49.7        Recent Labs     02/04/20  2149      K 3.8   CL 98   CO2 24   BUN 8   CREATININE 0.7   CALCIUM 9.2     Recent Labs     02/04/20  2149   *   *   BILITOT 0.9   ALKPHOS 88     No results for input(s): INR in the last 72 hours. No results for input(s): Emmanuelle Brandon in the last 72 hours. Urinalysis:      Lab Results   Component Value Date    NITRU NEGATIVE 02/06/2020    WBCUA NONE SEEN 02/06/2020    BACTERIA NONE SEEN 02/06/2020    RBCUA 0-2 02/06/2020    BLOODU NEGATIVE 02/06/2020    SPECGRAV 1.019 02/06/2020    GLUCOSEU 500 12/10/2015       Diagnostic imaging/procedures       No orders to display       No results found.     DVT prophylaxis: [x] Lovenox                                 [] SCDs                                 [] SQ Heparin                                 [] Encourage ambulation           [] Already on Anticoagulation     Disposition:    [x] to be discharged to home

## 2020-02-08 VITALS
HEART RATE: 71 BPM | DIASTOLIC BLOOD PRESSURE: 90 MMHG | OXYGEN SATURATION: 95 % | SYSTOLIC BLOOD PRESSURE: 134 MMHG | BODY MASS INDEX: 19.79 KG/M2 | TEMPERATURE: 98.6 F | WEIGHT: 138.25 LBS | HEIGHT: 70 IN | RESPIRATION RATE: 16 BRPM

## 2020-02-08 PROCEDURE — 99239 HOSP IP/OBS DSCHRG MGMT >30: CPT | Performed by: INTERNAL MEDICINE

## 2020-02-08 PROCEDURE — 2580000003 HC RX 258: Performed by: INTERNAL MEDICINE

## 2020-02-08 PROCEDURE — 6370000000 HC RX 637 (ALT 250 FOR IP): Performed by: INTERNAL MEDICINE

## 2020-02-08 RX ORDER — LANOLIN ALCOHOL/MO/W.PET/CERES
100 CREAM (GRAM) TOPICAL DAILY
Qty: 30 TABLET | Refills: 3 | Status: ON HOLD | OUTPATIENT
Start: 2020-02-09 | End: 2022-10-07

## 2020-02-08 RX ORDER — MULTIVITAMIN WITH FOLIC ACID 400 MCG
1 TABLET ORAL DAILY
Qty: 30 TABLET | Refills: 3 | Status: ON HOLD | OUTPATIENT
Start: 2020-02-09 | End: 2022-10-07

## 2020-02-08 RX ORDER — FOLIC ACID 1 MG/1
1 TABLET ORAL DAILY
Qty: 30 TABLET | Refills: 3 | Status: ON HOLD | OUTPATIENT
Start: 2020-02-09 | End: 2022-10-07

## 2020-02-08 RX ADMIN — Medication 100 MG: at 08:45

## 2020-02-08 RX ADMIN — Medication 1 PUFF: at 04:52

## 2020-02-08 RX ADMIN — SODIUM CHLORIDE, PRESERVATIVE FREE 10 ML: 5 INJECTION INTRAVENOUS at 08:45

## 2020-02-08 RX ADMIN — FOLIC ACID 1 MG: 1 TABLET ORAL at 08:45

## 2020-02-08 RX ADMIN — THERA TABS 1 TABLET: TAB at 08:45

## 2020-02-08 RX ADMIN — PHENOBARBITAL 32.4 MG: 32.4 TABLET ORAL at 08:45

## 2020-02-08 ASSESSMENT — PAIN SCALES - GENERAL
PAINLEVEL_OUTOF10: 0
PAINLEVEL_OUTOF10: 0

## 2020-02-08 NOTE — PLAN OF CARE
Problem: Falls - Risk of:  Goal: Will remain free from falls  Description  Will remain free from falls  Outcome: Ongoing  Note:   Patient free from falls. Fall precautions in place. Patient encouraged to use call light. Bed alarm on. Problem: Falls - Risk of:  Goal: Absence of physical injury  Description  Absence of physical injury  Outcome: Ongoing  Note:   Patient free from physical injury. Problem: Discharge Planning:  Goal: Discharged to appropriate level of care  Description  Discharged to appropriate level of care  Outcome: Ongoing  Note:   Patient plans to discharge home once completing dose of oral phenobarbital.      Problem: Fluid Volume - Deficit:  Goal: Absence of fluid volume deficit signs and symptoms  Description  Absence of fluid volume deficit signs and symptoms  Outcome: Ongoing  Note:   Patient's I&O's being monitored, MM intact, no edema noted. Patient appears to be eating and drinking well. Care plan reviewed with patient, no questions or concerns at this time .

## 2020-02-08 NOTE — DISCHARGE SUMMARY
Hospital Medicine Discharge Summary      Patient:  Kevin Melendrez  YOB: 1980  MRN: [id number]   PCP: No primary care provider on file. Acct: [de-identified]     Admit Date: 2/4/2020     Discharge Date:  2/8/2020      Admitting Physician: My Riena MD  Discharge Physician: Leann Robles MD     Discharge Diagnoses     DISCHARGE DIAGNOSES:  1. Suicidal ideation in setting of chronic depression: s/p sitter. Seen by Psychiatry who determined that patient could be discharged to home. 2. Uncomplicated alcohol withdrawal in setting of history of chronic alcoholism: Phenobarbital protocol. [employer]  3. Volume depletion: IV fluids, encourage oral intake  4. Panic disorder    Disposition:    [x] Home      Condition at Discharge: Stable    The patient was seen and examined on day of discharge and this discharge summary is in conjunction with any daily progress note from day of discharge. Hospital course     Kevin Melendrez is a 46 y.o. male male w/ a history of chronic depression and chronic alcoholism who was admitted for suicidal ideation and alcohol detox. He was treated via the phenobarbital protocol. He later denied suicidal ideation and Psychiatry determined that he could be discharged to home. He was otherwise stable at the time of discharge.       Discharge Medications      [facility] Medication Instructions [id number]    Printed on:02/08/20 0748   Medication Information                      folic acid (FOLVITE) 1 MG tablet  Take 1 tablet by mouth daily             Multiple Vitamin (MULTIVITAMIN) tablet  Take 1 tablet by mouth daily             vitamin B-1 100 MG tablet  Take 1 tablet by mouth daily                    Physical Examination     Vitals:  Vitals:    02/07/20 1208 02/07/20 2100 02/08/20 0445 02/08/20 0842   BP: 139/87 (!) 143/93 130/81 (!) 134/90   Pulse: 82 75 71 71   Resp: 18 18 18 16   Temp: 98.4 °F (36.9 °C) 98.3 °F (36.8 °C) 98.5 °F (36.9 °C) 98.6 °F Alma Ahumada, APRN - CNP  [address]  [phone]    On 2/14/2020  See Michelle on Friday Feb 14 arriving at 10:05 (for appt at 10:20). Take ID, insurance info and all medications. Code status at time of discharge     Full Code       Time Spent on discharge is more than 37 minutes in the examination, evaluation, counseling and review of medications and discharge plan. Signed: Thank you No primary care provider on file. for the opportunity to be involved in this patient's care.     Electronically signed by Aleksandra Camp MD on 2/8/2020 at 9:36 AM

## 2020-02-14 ENCOUNTER — OFFICE VISIT (OUTPATIENT)
Dept: FAMILY MEDICINE CLINIC | Age: 40
End: 2020-02-14
Payer: COMMERCIAL

## 2020-02-14 VITALS
WEIGHT: 141.8 LBS | OXYGEN SATURATION: 98 % | SYSTOLIC BLOOD PRESSURE: 132 MMHG | HEIGHT: 70 IN | DIASTOLIC BLOOD PRESSURE: 86 MMHG | RESPIRATION RATE: 12 BRPM | BODY MASS INDEX: 20.3 KG/M2 | HEART RATE: 74 BPM

## 2020-02-14 PROCEDURE — 99204 OFFICE O/P NEW MOD 45 MIN: CPT | Performed by: NURSE PRACTITIONER

## 2020-02-14 ASSESSMENT — ENCOUNTER SYMPTOMS
TROUBLE SWALLOWING: 0
EYE PAIN: 0
ABDOMINAL PAIN: 0
PHOTOPHOBIA: 0
COUGH: 1
NAUSEA: 0
SORE THROAT: 0
VOMITING: 0
BLOOD IN STOOL: 1
DIARRHEA: 1
EYE DISCHARGE: 0
SHORTNESS OF BREATH: 0
CONSTIPATION: 1
ABDOMINAL DISTENTION: 0

## 2020-02-14 NOTE — PROGRESS NOTES
Hepatitis A vaccine  Aged Out    Hepatitis B vaccine  Aged Out    Hib vaccine  Aged Out    Meningococcal (ACWY) vaccine  Aged Out       Past Medical History:   Diagnosis Date    Depressed     Panic disorder with agoraphobia     Fear of crowded spaces. Past Surgical History:   Procedure Laterality Date    MOUTH SURGERY Bilateral 10/2008     Family History   Problem Relation Age of Onset    Alcohol Abuse Father     Substance Abuse Father     High Blood Pressure Maternal Uncle     High Cholesterol Maternal Uncle     Cancer Maternal Grandmother      Social History     Tobacco Use    Smoking status: Current Every Day Smoker     Packs/day: 2.00     Years: 20.00     Pack years: 40.00     Types: Cigarettes    Smokeless tobacco: Never Used   Substance Use Topics    Alcohol use: Yes     Comment: drinks 1/3 to 1/2 of bottle of Catyh Knuckles a day for the last 3 years have worsen. Drinks alone. Current Outpatient Medications   Medication Sig Dispense Refill    folic acid (FOLVITE) 1 MG tablet Take 1 tablet by mouth daily 30 tablet 3    Multiple Vitamin (MULTIVITAMIN) tablet Take 1 tablet by mouth daily 30 tablet 3    vitamin B-1 100 MG tablet Take 1 tablet by mouth daily 30 tablet 3     No current facility-administered medications for this visit. No Known Allergies    Subjective:    Review of Systems   Constitutional: Negative for chills, fatigue, fever and unexpected weight change. HENT: Negative for congestion, ear discharge, ear pain, hearing loss, postnasal drip, sneezing, sore throat and trouble swallowing. Eyes: Negative for photophobia, pain and discharge. Respiratory: Positive for cough. Negative for shortness of breath. Cardiovascular: Negative for chest pain and palpitations. Gastrointestinal: Positive for blood in stool (Hemorroids), constipation and diarrhea. Negative for abdominal distention, abdominal pain, nausea and vomiting.    Genitourinary: Negative for difficulty 139 02/04/2020    K 3.8 02/04/2020    CL 98 02/04/2020    CREATININE 0.7 02/04/2020    BUN 8 02/04/2020    CO2 24 02/04/2020    TSH 0.991 02/04/2020    LABGLOM >90 02/04/2020    MG 1.8 02/05/2020    CALCIUM 9.2 02/04/2020       Assessment:       Diagnosis Orders   1. Uncomplicated alcohol withdrawal (HCC)  Basic Metabolic Panel    CBC Auto Differential    Hepatic Function Panel    Lipid Panel    Magnesium    T4    TSH with Reflex    Vitamin D 25 Hydroxy   2. Depression, major, recurrent, moderate (HCC)  T4    TSH with Reflex   3. Anxiety  Basic Metabolic Panel    CBC Auto Differential    Hepatic Function Panel    Lipid Panel    Magnesium    T4    TSH with Reflex    Vitamin D 25 Hydroxy   4. Tobacco abuse  Basic Metabolic Panel    CBC Auto Differential    Hepatic Function Panel    Lipid Panel    Magnesium    T4    TSH with Reflex    Vitamin D 25 Hydroxy    Hepatitis C Antibody    XR CHEST STANDARD (2 VW)   5. Bleeding external hemorrhoids     6. Encounter for screening for HIV  HIV Screen   7. Decreased vision  Lithuanian Radha, O.D., Optometry, [place] [address]   8. Thyroid disorder screening  T4    TSH with Reflex   9. Wellness examination  Basic Metabolic Panel    CBC Auto Differential    Hepatic Function Panel    Lipid Panel    Magnesium    T4    TSH with Reflex    Vitamin D 25 Hydroxy    Hepatitis C Antibody    HIV Screen       Plan:   1. Uncomplicated alcohol withdrawal ([facility] [address]    - Basic Metabolic Panel; Future  - CBC Auto Differential; Future  - Hepatic Function Panel; Future  - Lipid Panel; Future  - Magnesium; Future  - T4; Future  - TSH with Reflex; Future  - Vitamin D 25 Hydroxy; Future    2. Depression, major, recurrent, moderate (HCC)    - T4; Future  - TSH with Reflex; Future    3. Anxiety    - Basic Metabolic Panel; Future  - CBC Auto Differential; Future  - Hepatic Function Panel; Future  - Lipid Panel; Future  - Magnesium; Future  - T4; Future  - TSH with Reflex; Future  - Vitamin D 25 Hydroxy; Future    4. Tobacco abuse    - Basic Metabolic Panel; Future  - CBC Auto Differential; Future  - Hepatic Function Panel; Future  - Lipid Panel; Future  - Magnesium; Future  - T4; Future  - TSH with Reflex; Future  - Vitamin D 25 Hydroxy; Future  - Hepatitis C Antibody; Future  - XR CHEST STANDARD (2 VW); Future    5. Bleeding external hemorrhoids      6. Encounter for screening for HIV    - HIV Screen; Future    7. Decreased vision    - Brittney Conteh O.GAURAV, Optometry, [address] [place] [address]    8. Thyroid disorder screening    - T4; Future  - TSH with Reflex; Future    9. Wellness examination    - Basic Metabolic Panel; Future  - CBC Auto Differential; Future  - Hepatic Function Panel; Future  - Lipid Panel; Future  - Magnesium; Future  - T4; Future  - TSH with Reflex; Future  - Vitamin D 25 Hydroxy; Future  - Hepatitis C Antibody; Future  - HIV Screen; Future    Next  -gender change referral  -review labs         No follow-ups on file. Orders Placed:  Orders Placed This Encounter   Procedures    XR CHEST STANDARD (2 VW)    Basic Metabolic Panel    CBC Auto Differential    Hepatic Function Panel    Lipid Panel    Magnesium    T4    TSH with Reflex    Vitamin D 25 Hydroxy    Hepatitis C Antibody    HIV Screen   Ava Garduno O.D., [facility], [address] [place] [address]     Medications Prescribed:  No orders of the defined types were placed in this encounter. Future Appointments   Date Time Provider Eun [place]   3/20/2020  8:00 AM ASHLEY Lyons CNP [facility] [address]        Patient given educational materials - see patient instructions. Discussed use, benefit, and side effects of prescribedmedications. All patient questions answered. Pt voiced understanding. Reviewed health maintenance. Instructed to continue current medications, diet and exercise. Patient agreed with treatment plan. Follow up as directed.     Electronically signed by ASHLEY Lyons CNP on 2/14/2020 at 11:41 AM

## 2020-03-17 ENCOUNTER — TELEPHONE (OUTPATIENT)
Dept: FAMILY MEDICINE CLINIC | Age: 40
End: 2020-03-17

## 2020-03-17 NOTE — TELEPHONE ENCOUNTER
Call from Komal Jose at [facility] regarding reactive HIV test results for this patient.      Komal Jose contact info: [phone] (easiest way to get a hold of her) or [phone] x [phone]                        Michelle please see results scanned in media and advise

## 2020-03-19 LAB
BUN BLDV-MCNC: 11 MG/DL
CALCIUM SERPL-MCNC: 10 MG/DL
CHLORIDE BLD-SCNC: 105 MMOL/L
CHOLESTEROL, TOTAL: 147 MG/DL
CHOLESTEROL/HDL RATIO: 4.3
CO2: 31 MMOL/L
CREAT SERPL-MCNC: 0.8 MG/DL
GFR CALCULATED: >60
GLUCOSE BLD-MCNC: 78 MG/DL
HDLC SERPL-MCNC: 34 MG/DL (ref 35–70)
LDL CHOLESTEROL CALCULATED: 111 MG/DL (ref 0–160)
POTASSIUM SERPL-SCNC: 4 MMOL/L
SODIUM BLD-SCNC: 139 MMOL/L
TRIGL SERPL-MCNC: 120 MG/DL
VLDLC SERPL CALC-MCNC: 24 MG/DL

## 2020-03-19 NOTE — TELEPHONE ENCOUNTER
Called back Inocente Upton from [facility] at [phone]. Got recommendations for ID referral.  [facility] lab to receive the rest of the results.

## 2020-03-19 NOTE — TELEPHONE ENCOUNTER
[facility], asking about the Status of this mess.  Please call [phone]

## 2020-03-20 ENCOUNTER — OFFICE VISIT (OUTPATIENT)
Dept: FAMILY MEDICINE CLINIC | Age: 40
End: 2020-03-20
Payer: COMMERCIAL

## 2020-03-20 VITALS
BODY MASS INDEX: 20.27 KG/M2 | HEART RATE: 74 BPM | DIASTOLIC BLOOD PRESSURE: 76 MMHG | RESPIRATION RATE: 16 BRPM | OXYGEN SATURATION: 98 % | SYSTOLIC BLOOD PRESSURE: 118 MMHG | TEMPERATURE: 97.6 F | WEIGHT: 141.6 LBS | HEIGHT: 70 IN

## 2020-03-20 PROCEDURE — 99215 OFFICE O/P EST HI 40 MIN: CPT | Performed by: NURSE PRACTITIONER

## 2020-03-20 RX ORDER — MAGNESIUM 200 MG
200 TABLET ORAL
Qty: 360 TABLET | Refills: 3 | Status: ON HOLD | OUTPATIENT
Start: 2020-03-20 | End: 2022-10-07

## 2020-03-20 RX ORDER — CHOLECALCIFEROL (VITAMIN D3) 50 MCG
6000 TABLET ORAL DAILY
Qty: 270 TABLET | Refills: 3 | Status: ON HOLD | OUTPATIENT
Start: 2020-03-20 | End: 2022-10-07

## 2020-03-20 ASSESSMENT — ENCOUNTER SYMPTOMS
SHORTNESS OF BREATH: 0
BLOOD IN STOOL: 0
ABDOMINAL PAIN: 0
EYE DISCHARGE: 0
VOMITING: 0
SORE THROAT: 0
NAUSEA: 0
ABDOMINAL DISTENTION: 0
TROUBLE SWALLOWING: 0
PHOTOPHOBIA: 0
DIARRHEA: 0
COUGH: 0
CONSTIPATION: 0
EYE PAIN: 0

## 2020-03-20 ASSESSMENT — PATIENT HEALTH QUESTIONNAIRE - PHQ9
SUM OF ALL RESPONSES TO PHQ9 QUESTIONS 1 & 2: 0
1. LITTLE INTEREST OR PLEASURE IN DOING THINGS: 0
2. FEELING DOWN, DEPRESSED OR HOPELESS: 0
SUM OF ALL RESPONSES TO PHQ QUESTIONS 1-9: 0
SUM OF ALL RESPONSES TO PHQ QUESTIONS 1-9: 0

## 2020-03-20 NOTE — PROGRESS NOTES
[address] [facility]  [phone] (phone)  [phone] (fax)    Visit Date: 3/20/2020    Sandra Dsouza is a 45 y.o. male who presents today for:  Chief Complaint   Patient presents with    Discuss Labs     review labs completed             HPI:     HIV  New diagnoses  -Last sexual contact 3 years ago when he broke up with his girlfriend.   -Previous high risk sexual behavior, and alcohol abuse      Pt has no symptoms today  Low Vit D 10, HDL 37      Depression  PHQ Scores 3/20/2020   PHQ2 Score 0   PHQ9 Score 0     HPI  Health Maintenance   Topic Date Due    Varicella vaccine (1 of 2 - 2-dose childhood series) 03/07/1982    Pneumococcal 0-64 years Vaccine (1 of 1 - PPSV23) 03/07/1987    Flu vaccine (1) 09/01/2019    DTaP/Tdap/Td vaccine (2 - Td) 08/26/2023    Shingles Vaccine (1 of 2) 03/07/2031    HIV screen  Completed    Hepatitis A vaccine  Aged Out    Hepatitis B vaccine  Aged Out    Hib vaccine  Aged Out    Meningococcal (ACWY) vaccine  Aged Out       Past Medical History:   Diagnosis Date    Depressed     Panic disorder with agoraphobia     Fear of crowded spaces. Past Surgical History:   Procedure Laterality Date    MOUTH SURGERY Bilateral 10/2008     Family History   Problem Relation Age of Onset    Alcohol Abuse Father     Substance Abuse Father     High Blood Pressure Maternal Uncle     High Cholesterol Maternal Uncle     Cancer Maternal Grandmother      Social History     Tobacco Use    Smoking status: Current Every Day Smoker     Packs/day: 2.00     Years: 20.00     Pack years: 40.00     Types: Cigarettes    Smokeless tobacco: Never Used   Substance Use Topics    Alcohol use: Yes     Comment: Stopped Feb 2020 drinks 1/3 to 1/2 of bottle of Valetta Naegeli a day for the last 3 years.        Current Outpatient Medications   Medication Sig Dispense Refill    Cholecalciferol (VITAMIN D) 50 MCG (2000 UT) TABS tablet Take 3 tablets by mouth daily for a week then mouth 4 times daily (after meals and at bedtime)     Dispense:  360 tablet     Refill:  3       Future Appointments   Date Time Provider Eun Haley   5/19/2020  8:00 AM ASHLEY Hayward CNP [facility] [address]        Patient given educational materials - see patient instructions. Discussed use, benefit, and side effects of prescribedmedications. All patient questions answered. Pt voiced understanding. Reviewed health maintenance. Instructed to continue current medications, diet and exercise. Patient agreed with treatment plan. Follow up as directed.     Electronically signed by ASHLEY Hayward CNP on 3/20/2020 at 11:53 AM

## 2020-03-20 NOTE — PATIENT INSTRUCTIONS
have shortness of breath.     · You have unusual bleeding, such as from your nose or gums, blood in your urine or stool, or easy bruising.     · You have changes in vision.    Watch closely for changes in your health, and be sure to contact your doctor if:    · You have a cough that does not go away, especially if you also have a fever.     · You have rapid, unexplained weight loss.     · You have night sweats.     · You have swollen lymph nodes in your neck, armpits, or groin.     · You feel very tired. Where can you learn more? Go to [email or web address] and sign in to your [employer] account. Enter K412 in the [employer] box to learn more about \"HIV: Care Instructions. \"     If you do not have an account, please click on the \"Sign Up Now\" link. Current as of: January 26, 2020Content Version: 12.4  © 1062-1483 [employer]. Care instructions adapted under license by [facility] ([facility]). If you have questions about a medical condition or this instruction, always ask your healthcare professional. [place] [address] any warranty or liability for your use of this information. Patient Education        HIV Medicine Management: Care Instructions  Your Care Instructions    Taking antiretroviral medicines for HIV may allow you to stay healthy for a long time. Successful treatment of your HIV infection depends on following a strict schedule for taking medicine. Not taking medicine when you are supposed to can lead to problems. It can lead to things such as drug resistance and higher viral loads, and the disease may get worse. In the past, schedules for taking medicine for HIV were very complicated. Taking many pills several times each day was difficult. But the routine has become much more simple. You may only have to take medicine one or two times each day. Follow-up care is a key part of your treatment and safety.  Be sure to make and go to all of all the medicines you take, and bring it with you to each visit with your doctor. Have your doctor review your list at each visit. When should you call for help? Watch closely for changes in your health, and be sure to contact your doctor if you have any problems with your medicine. Where can you learn more? Go to [email or web address]://[email or web address].[email or web address] and sign in to your [employer] account. Enter N689 in the [employer] box to learn more about \"HIV Medicine Management: Care Instructions. \"     If you do not have an account, please click on the \"Sign Up Now\" link. Current as of: January 26, 2020Content Version: 12.4  © 2022-3794 [employer]. Care instructions adapted under license by [facility] ([facility]). If you have questions about a medical condition or this instruction, always ask your healthcare professional. [address] any warranty or liability for your use of this information.

## 2020-03-23 ENCOUNTER — TELEPHONE (OUTPATIENT)
Dept: FAMILY MEDICINE CLINIC | Age: 40
End: 2020-03-23

## 2020-03-23 NOTE — TELEPHONE ENCOUNTER
3/23/20 Matilda at Dr Georgina Malik office requesting copy of recent labs for patient as coming in today. Please fax to her attn: at [phone].    Thanks/blm

## 2020-07-08 ENCOUNTER — TELEPHONE (OUTPATIENT)
Dept: FAMILY MEDICINE CLINIC | Age: 40
End: 2020-07-08

## 2020-07-10 NOTE — TELEPHONE ENCOUNTER
Spoke with patient. Was never seen or called on 7/8/2020.  Rescheduled for same say 7/10/2020 at 11:40am.

## 2020-12-29 ENCOUNTER — TELEPHONE (OUTPATIENT)
Dept: FAMILY MEDICINE CLINIC | Age: 40
End: 2020-12-29

## 2020-12-29 NOTE — TELEPHONE ENCOUNTER
Patients last appointment was : 7/10/2020  Return in about 2 months   Patients next appointment is : Visit date not found    Called [phone], No answer. Left voicemail for patient to call the office back. Yudith Reed needs to schedule an appointment at our office.  I left a voicemail asking the patient to please call us back at [phone], option 1 will take them to the scheduling department, please schedule with a resident

## 2022-10-07 ENCOUNTER — HOSPITAL ENCOUNTER (INPATIENT)
Age: 42
LOS: 4 days | Discharge: HOME | DRG: 751 | End: 2022-10-11
Attending: PSYCHIATRY & NEUROLOGY | Admitting: PSYCHIATRY & NEUROLOGY
Payer: MEDICAID

## 2022-10-07 DIAGNOSIS — R45.851 DEPRESSION WITH SUICIDAL IDEATION: Primary | ICD-10-CM

## 2022-10-07 DIAGNOSIS — F32.A DEPRESSION WITH SUICIDAL IDEATION: Primary | ICD-10-CM

## 2022-10-07 LAB
ACETAMINOPHEN LEVEL: < 5 UG/ML (ref 0–20)
ALBUMIN SERPL-MCNC: 3.9 G/DL (ref 3.5–5.1)
ALP BLD-CCNC: 69 U/L (ref 38–126)
ALT SERPL-CCNC: 22 U/L (ref 11–66)
AMPHETAMINE+METHAMPHETAMINE URINE SCREEN: NEGATIVE
ANION GAP SERPL CALCULATED.3IONS-SCNC: 12 MEQ/L (ref 8–16)
AST SERPL-CCNC: 23 U/L (ref 5–40)
BARBITURATE QUANTITATIVE URINE: NEGATIVE
BASOPHILS # BLD: 0.4 %
BASOPHILS ABSOLUTE: 0 THOU/MM3 (ref 0–0.1)
BENZODIAZEPINE QUANTITATIVE URINE: NEGATIVE
BILIRUB SERPL-MCNC: 0.4 MG/DL (ref 0.3–1.2)
BUN BLDV-MCNC: 11 MG/DL (ref 7–22)
CALCIUM SERPL-MCNC: 9.3 MG/DL (ref 8.5–10.5)
CANNABINOID QUANTITATIVE URINE: NEGATIVE
CHLORIDE BLD-SCNC: 103 MEQ/L (ref 98–111)
CO2: 21 MEQ/L (ref 23–33)
COCAINE METABOLITE QUANTITATIVE URINE: NEGATIVE
CREAT SERPL-MCNC: 0.6 MG/DL (ref 0.4–1.2)
EOSINOPHIL # BLD: 1.8 %
EOSINOPHILS ABSOLUTE: 0.1 THOU/MM3 (ref 0–0.4)
ERYTHROCYTE [DISTWIDTH] IN BLOOD BY AUTOMATED COUNT: 12.2 % (ref 11.5–14.5)
ERYTHROCYTE [DISTWIDTH] IN BLOOD BY AUTOMATED COUNT: 42.1 FL (ref 35–45)
ETHYL ALCOHOL, SERUM: < 0.01 %
GFR SERPL CREATININE-BSD FRML MDRD: > 90 ML/MIN/1.73M2
GLUCOSE BLD-MCNC: 101 MG/DL (ref 70–108)
HCT VFR BLD CALC: 41.9 % (ref 42–52)
HEMOGLOBIN: 15 GM/DL (ref 14–18)
IMMATURE GRANS (ABS): 0.03 THOU/MM3 (ref 0–0.07)
IMMATURE GRANULOCYTES: 0.4 %
LYMPHOCYTES # BLD: 30.3 %
LYMPHOCYTES ABSOLUTE: 2.4 THOU/MM3 (ref 1–4.8)
MCH RBC QN AUTO: 33.5 PG (ref 26–33)
MCHC RBC AUTO-ENTMCNC: 35.8 GM/DL (ref 32.2–35.5)
MCV RBC AUTO: 93.5 FL (ref 80–94)
MONOCYTES # BLD: 8.1 %
MONOCYTES ABSOLUTE: 0.6 THOU/MM3 (ref 0.4–1.3)
NUCLEATED RED BLOOD CELLS: 0 /100 WBC
OPIATES, URINE: NEGATIVE
OSMOLALITY CALCULATION: 271.5 MOSMOL/KG (ref 275–300)
OXYCODONE: NEGATIVE
PHENCYCLIDINE QUANTITATIVE URINE: NEGATIVE
PLATELET # BLD: 326 THOU/MM3 (ref 130–400)
PMV BLD AUTO: 9.3 FL (ref 9.4–12.4)
POTASSIUM SERPL-SCNC: 4.2 MEQ/L (ref 3.5–5.2)
RBC # BLD: 4.48 MILL/MM3 (ref 4.7–6.1)
SALICYLATE, SERUM: < 0.3 MG/DL (ref 2–10)
SEG NEUTROPHILS: 59 %
SEGMENTED NEUTROPHILS ABSOLUTE COUNT: 4.6 THOU/MM3 (ref 1.8–7.7)
SODIUM BLD-SCNC: 136 MEQ/L (ref 135–145)
TOTAL PROTEIN: 7 G/DL (ref 6.1–8)
WBC # BLD: 7.8 THOU/MM3 (ref 4.8–10.8)

## 2022-10-07 PROCEDURE — 85025 COMPLETE CBC W/AUTO DIFF WBC: CPT

## 2022-10-07 PROCEDURE — 99285 EMERGENCY DEPT VISIT HI MDM: CPT

## 2022-10-07 PROCEDURE — 80307 DRUG TEST PRSMV CHEM ANLYZR: CPT

## 2022-10-07 PROCEDURE — 6370000000 HC RX 637 (ALT 250 FOR IP): Performed by: PSYCHIATRY & NEUROLOGY

## 2022-10-07 PROCEDURE — 80143 DRUG ASSAY ACETAMINOPHEN: CPT

## 2022-10-07 PROCEDURE — 36415 COLL VENOUS BLD VENIPUNCTURE: CPT

## 2022-10-07 PROCEDURE — 1240000000 HC EMOTIONAL WELLNESS R&B

## 2022-10-07 PROCEDURE — 80053 COMPREHEN METABOLIC PANEL: CPT

## 2022-10-07 PROCEDURE — 82077 ASSAY SPEC XCP UR&BREATH IA: CPT

## 2022-10-07 PROCEDURE — 6370000000 HC RX 637 (ALT 250 FOR IP): Performed by: PHYSICIAN ASSISTANT

## 2022-10-07 PROCEDURE — 80179 DRUG ASSAY SALICYLATE: CPT

## 2022-10-07 RX ORDER — POLYETHYLENE GLYCOL 3350 17 G/17G
17 POWDER, FOR SOLUTION ORAL DAILY PRN
Status: DISCONTINUED | OUTPATIENT
Start: 2022-10-07 | End: 2022-10-11 | Stop reason: HOSPADM

## 2022-10-07 RX ORDER — IBUPROFEN 200 MG
400 TABLET ORAL EVERY 6 HOURS PRN
Status: DISCONTINUED | OUTPATIENT
Start: 2022-10-07 | End: 2022-10-11 | Stop reason: HOSPADM

## 2022-10-07 RX ORDER — LORAZEPAM 1 MG/1
1 TABLET ORAL ONCE
Status: COMPLETED | OUTPATIENT
Start: 2022-10-07 | End: 2022-10-07

## 2022-10-07 RX ORDER — TRAZODONE HYDROCHLORIDE 50 MG/1
50 TABLET ORAL NIGHTLY PRN
Status: DISCONTINUED | OUTPATIENT
Start: 2022-10-07 | End: 2022-10-11 | Stop reason: HOSPADM

## 2022-10-07 RX ORDER — HYDROXYZINE HYDROCHLORIDE 25 MG/1
50 TABLET, FILM COATED ORAL 3 TIMES DAILY PRN
Status: DISCONTINUED | OUTPATIENT
Start: 2022-10-07 | End: 2022-10-11 | Stop reason: HOSPADM

## 2022-10-07 RX ORDER — PROGESTERONE 200 MG/1
200 CAPSULE ORAL DAILY
COMMUNITY

## 2022-10-07 RX ORDER — ACETAMINOPHEN 325 MG/1
650 TABLET ORAL EVERY 4 HOURS PRN
Status: DISCONTINUED | OUTPATIENT
Start: 2022-10-07 | End: 2022-10-11 | Stop reason: HOSPADM

## 2022-10-07 RX ORDER — NICOTINE 21 MG/24HR
1 PATCH, TRANSDERMAL 24 HOURS TRANSDERMAL DAILY
Status: DISCONTINUED | OUTPATIENT
Start: 2022-10-07 | End: 2022-10-11 | Stop reason: HOSPADM

## 2022-10-07 RX ADMIN — LORAZEPAM 1 MG: 1 TABLET ORAL at 15:06

## 2022-10-07 RX ADMIN — HYDROXYZINE HYDROCHLORIDE 50 MG: 25 TABLET, FILM COATED ORAL at 21:22

## 2022-10-07 RX ADMIN — TRAZODONE HYDROCHLORIDE 50 MG: 50 TABLET ORAL at 21:22

## 2022-10-07 ASSESSMENT — ENCOUNTER SYMPTOMS
VOMITING: 0
NAUSEA: 0
ABDOMINAL PAIN: 0
COUGH: 0
SHORTNESS OF BREATH: 0
RHINORRHEA: 0

## 2022-10-07 ASSESSMENT — SLEEP AND FATIGUE QUESTIONNAIRES
DO YOU USE A SLEEP AID: NO
DO YOU USE A SLEEP AID: NO
DO YOU HAVE DIFFICULTY SLEEPING: NO
DO YOU HAVE DIFFICULTY SLEEPING: NO
AVERAGE NUMBER OF SLEEP HOURS: 7
AVERAGE NUMBER OF SLEEP HOURS: 7

## 2022-10-07 ASSESSMENT — PATIENT HEALTH QUESTIONNAIRE - PHQ9
SUM OF ALL RESPONSES TO PHQ QUESTIONS 1-9: 24
SUM OF ALL RESPONSES TO PHQ QUESTIONS 1-9: 24

## 2022-10-07 ASSESSMENT — PAIN SCALES - GENERAL: PAINLEVEL_OUTOF10: 0

## 2022-10-07 ASSESSMENT — LIFESTYLE VARIABLES
HOW OFTEN DO YOU HAVE A DRINK CONTAINING ALCOHOL: NEVER
HOW MANY STANDARD DRINKS CONTAINING ALCOHOL DO YOU HAVE ON A TYPICAL DAY: PATIENT DOES NOT DRINK
HOW MANY STANDARD DRINKS CONTAINING ALCOHOL DO YOU HAVE ON A TYPICAL DAY: PATIENT DOES NOT DRINK
HOW OFTEN DO YOU HAVE A DRINK CONTAINING ALCOHOL: NEVER

## 2022-10-07 ASSESSMENT — PAIN - FUNCTIONAL ASSESSMENT: PAIN_FUNCTIONAL_ASSESSMENT: NONE - DENIES PAIN

## 2022-10-07 NOTE — ED NOTES
Assumed care at this time. Received report from [place]. Pt resting in bed alert and vs assessed. RR easy and unlabored. Pt stable at this time and denies any needs.  Continuous monitoring in place by campus police     [facility]  10/07/22 5329

## 2022-10-07 NOTE — ED PROVIDER NOTES
[facility] EMERGENCY DEPT      CHIEF COMPLAINT       Chief Complaint   Patient presents with    Suicidal       Nurses Notes reviewed and I agree except as noted in the HPI. HISTORY OF PRESENT ILLNESS    Gwenetta Friday Kristy Keene is a 40 y.o. transgender adult who presents for mental evaluation. Patient was at her counseling appointment at [facility] and revealed that she had been looking for her gun that had been hidden with intent to kill herself. Patient explains that she has suicidal thoughts daily and has had prior attempts. Patient was put on new medicines for depression and anxiety 3 to 4 months ago which she feels are ineffective. Patient does not feel the medications have made her thoughts worse. Patient denies any physical complaints such as chest pain, abdominal pain, URI symptoms, vomiting, diarrhea, or fever. Patient admits to smoking but denies alcohol or illicit drug use. Patient has a history of HIV with the current undetectable viral load. REVIEW OF SYSTEMS     Review of Systems   Constitutional:  Negative for appetite change and fever. HENT:  Negative for congestion and rhinorrhea. Eyes:  Negative for visual disturbance. Respiratory:  Negative for cough and shortness of breath. Cardiovascular:  Negative for chest pain. Gastrointestinal:  Negative for abdominal pain, nausea and vomiting. Genitourinary:  Negative for decreased urine volume. Musculoskeletal:  Negative for gait problem. Skin:  Negative for rash and wound. Neurological:  Negative for headaches. Psychiatric/Behavioral:  Positive for dysphoric mood and suicidal ideas. Negative for hallucinations and sleep disturbance. PAST MEDICAL HISTORY    has a past medical history of Depressed and Panic disorder with agoraphobia. SURGICAL HISTORY      has a past surgical history that includes Mouth surgery (Bilateral, 10/2008).     CURRENT MEDICATIONS       Previous Medications    BIKTARVY -25 MG TABS PER TABLET    TK 1 T PO QD    BUPROPION (WELLBUTRIN XL) 150 MG EXTENDED RELEASE TABLET    Take 2 tablets by mouth daily    CHOLECALCIFEROL (VITAMIN D) 50 MCG (2000 UT) TABS TABLET    Take 3 tablets by mouth daily for a week then starting the second week continue by taking 2 capsules daily    FOLIC ACID (FOLVITE) 1 MG TABLET    Take 1 tablet by mouth daily    HYDROXYZINE (VISTARIL) 50 MG CAPSULE    take 1 tablet by mouth three times a day if needed    MAGNESIUM 200 MG TABS TABLET    Take 1 tablet by mouth 4 times daily (after meals and at bedtime)    MULTIPLE VITAMIN (MULTIVITAMIN) TABLET    Take 1 tablet by mouth daily    TRAZODONE (DESYREL) 50 MG TABLET    take 1 tablet by mouth at bedtime if needed    VITAMIN B-1 100 MG TABLET    Take 1 tablet by mouth daily       ALLERGIES     has No Known Allergies. FAMILY HISTORY     She indicated that her mother is alive. She indicated that her father is alive. She indicated that the status of her maternal grandmother is unknown. She indicated that the status of her maternal uncle is unknown.   family history includes Alcohol Abuse in her father; Cancer in her maternal grandmother; High Blood Pressure in her maternal uncle; High Cholesterol in her maternal uncle; Substance Abuse in her father. SOCIAL HISTORY    reports that she has been smoking cigarettes. She has a 40.00 pack-year smoking history. She has never used smokeless tobacco. She reports current alcohol use. She reports that she does not use drugs. PHYSICAL EXAM     INITIAL VITALS:  height is 5' 10\" (1.778 m) and weight is 175 lb (79.4 kg). Her oral temperature is 97.7 °F (36.5 °C). Her blood pressure is 127/85 and her pulse is 69. Her respiration is 18 and oxygen saturation is 97%. Physical Exam  Vitals and nursing note reviewed. Constitutional:       General: She is not in acute distress. Appearance: She is well-developed. She is not toxic-appearing or diaphoretic.    HENT:      Head: Normocephalic and atraumatic. Right Ear: Hearing normal.      Left Ear: Hearing normal.      Nose: Nose normal. No rhinorrhea. Mouth/Throat:      Pharynx: Uvula midline. No oropharyngeal exudate. Eyes:      General: Lids are normal. No scleral icterus. Conjunctiva/sclera: Conjunctivae normal.      Pupils: Pupils are equal, round, and reactive to light. Neck:      Trachea: No tracheal deviation. Cardiovascular:      Rate and Rhythm: Normal rate and regular rhythm. Heart sounds: Normal heart sounds. No murmur heard. Pulmonary:      Effort: Pulmonary effort is normal. No respiratory distress. Breath sounds: Normal breath sounds. No stridor. No decreased breath sounds or wheezing. Abdominal:      General: There is no distension. Palpations: Abdomen is soft. Abdomen is not rigid. Tenderness: There is no abdominal tenderness. There is no guarding. Musculoskeletal:         General: Normal range of motion. Cervical back: Normal range of motion and neck supple. No rigidity. Lymphadenopathy:      Cervical: No cervical adenopathy. Skin:     General: Skin is warm and dry. Coloration: Skin is not pale. Findings: No rash. Neurological:      Mental Status: She is alert and oriented to person, place, and time. GCS: GCS eye subscore is 4. GCS verbal subscore is 5. GCS motor subscore is 6. Gait: Gait normal.      Comments: No gross deficits observed   Psychiatric:         Mood and Affect: Mood is depressed. Affect is flat. Speech: Speech normal.         Behavior: Behavior normal. Behavior is cooperative. Thought Content: Thought content includes suicidal ideation. Thought content includes suicidal plan.        DIFFERENTIAL DIAGNOSIS:   Including but not limited to: Suicidal ideations, depression, adjustment disorder, bipolar disorder    DIAGNOSTIC RESULTS     EKG: All [facility]'s are [facility] who either signs or Co-signs this chart in the absence of a cardiologist.  None    RADIOLOGY: non-plain film images(s) such as CT,Ultrasound and MRI are read by the radiologist.  Plain radiographic images are visualized and preliminarily interpreted by the emergency physician unless otherwise stated below. No orders to display       LABS:   Labs Reviewed   CBC WITH AUTO DIFFERENTIAL - Abnormal; Notable for the following components:       Result Value    RBC 4.48 (*)     Hematocrit 41.9 (*)     MCH 33.5 (*)     MCHC 35.8 (*)     MPV 9.3 (*)     All other components within normal limits   COMPREHENSIVE METABOLIC PANEL - Abnormal; Notable for the following components:    CO2 21 (*)     All other components within normal limits   SALICYLATE LEVEL - Abnormal; Notable for the following components:    Salicylate, Serum < 0.3 (*)     All other components within normal limits   OSMOLALITY - Abnormal; Notable for the following components:    Osmolality Calc 271.5 (*)     All other components within normal limits   ACETAMINOPHEN LEVEL   ETHANOL   URINE DRUG SCREEN   ANION GAP   GLOMERULAR FILTRATION RATE, ESTIMATED       EMERGENCY DEPARTMENT COURSE:   Vitals:    Vitals:    10/07/22 1253 10/07/22 1321   BP: (!) 143/81 127/85   Pulse: 67 69   Resp: 16 18   Temp: 97.7 °F (36.5 °C)    TempSrc: Oral    SpO2: 100% 97%   Weight: 175 lb (79.4 kg)    Height: 5' 10\" (1.778 m)        MDM:  The patient was seen in emergency room by me for suicidal ideation with a plan to shoot herself. Old records were reviewed. Physical exam revealed a quiet 50-year-old female who was cooperative. Appropriate labs were ordered. The patient was closely observed and vital signs monitored. Labs were reviewed upon completion. Labs reflected no significant abnormality. The results were discussed with the patient. Patient became anxious in the department was medicated with Ativan orally.   The patient was thoroughly evaluated by Jakob from [facility] AN AFFILIATE OF [facility], who consulted Dr. César Lea of psychiatry, who advised admission. The patient was admitted to the hospital in fair condition. All questions were addressed. CRITICAL CARE:   None    CONSULTS:  Jakob ([facility])    PROCEDURES:  None    FINAL IMPRESSION      1. Depression with suicidal ideation          DISPOSITION/PLAN     1.  Depression with suicidal ideation    Admission      (Please note that portions of this note were completed with a voice recognition program.  Efforts were made to edit the dictations but occasionally words are mis-transcribed.)    Chloé Weiner PA-C 10/07/22 4:45 PM    JERMAINE Tucker PA-C  10/07/22 [id number]

## 2022-10-07 NOTE — ED NOTES
Pt resting in safe room alert. RR easy and unlabored. Pt stable at this time and denies any needs. No distress noted.  Continuous monitoring in place by [place] police     Kiersten Marin, [address] [facility]  10/07/22 [id number]

## 2022-10-07 NOTE — ED NOTES
Pt resting in safe room alert leaning against wall. RR easy and unlabored. No distress noted. Pt stable at this time.  Continuous monitoring in place by [place] police     [facility]  10/07/22 [id number]

## 2022-10-07 NOTE — ED NOTES
Pt transported from [address] [place] [address] to [facility] on cart in stable condition. Writer spoke to [employer] prior to transport.       Neil Montgomery  10/07/22 2496

## 2022-10-07 NOTE — ED NOTES
Pt resting on ground in safe room alert. [place] police state pt continuously hitting her head on the wall. Pt medicated per MAR and calm and cooperative at this time. Pt stable at this time.  Continuous monitoring in place by [place] police     Alexy Gutierrez, [address] [place] [address]  10/07/22 0083

## 2022-10-07 NOTE — PROGRESS NOTES
Chief Complaint:   Suicidal      Provisional Diagnosis: Major Depressive Disorder      Risk, Psychosocial and Contextual Factors: hx of suicide attempt      Current  Treatment: Counselor at [facility]        Present Suicidal Behavior:    Verbal: Yes    Attempt: Denies      Access to Weapons: Yes      C-SSRS Current Suicide Risk: Low, Moderate or High: High        Past Suicidal Behavior:    Verbal: Yes    Attempts: Yes      Self-Injurious/Self-Mutilation: Denies      Traumatic Event Within Past 2 Weeks: \"Everything\"      Current Abuse:  Denies      Legal: Denies      Violence: Denies      Protective Factors:  Connected to outpatient [facility] provider      Housing: Lives with Dad      Clinical Summary:      Patient is a 40year old female (transgender MTF) who presents to ED voluntarily reporting suicidal thoughts with a plan to shoot self with gun she has access to at home. Patient reports \"everything\" in life has triggered her suicidal thoughts, does not specify. Patient reports occasional visual hallucinations, none recent. Patient reports she was an alcoholic previously but has not had alcohol in two years, denies any other substance use. Patient is connected to counseling services at [facility] where she states she is comfortable and feels it is helpful for the most part. Patient denies any homicidal ideation. Currently resides with her father. Level of Care Disposition:      Consulted with medical provider. Patient is medically cleared. Perfectserved Dr. Heavenly Samaniego. Consulted with Dr. Heavenly Samaniego. Patient to be admitted to 4E. Patient updated on POC. Voluntary form signed and placed in safe room chart 27. Medical provider and Charge RN updated on POC. Report given to Nurse Izabela Nicholas on 4E.

## 2022-10-07 NOTE — PROGRESS NOTES
copy of patient rights given. Received admission packet:  Yes  Consents reviewed, signed Yes. Outcomes Questionnaire completed Yes. \"An Important Message from [employer] About Your Rights\" form reviewed, signed: N/A . Patient verbalize understanding: Yes. Patient education on precautions: YES/NO/NA: yes          Patient screened positive for suicide risk on CSSR-S (\"yes\" to question #4, 5, OR 6)  yes. Physician notified of risk score. Orders received yes . 2 person skin assessment completed upon admission declined. Explained patients right to have family, representative or physician notified of their admission. Patient has Declined for physician to be notified. Patient has Declined for family/representative to be notified. Provided pt with [employer] handout entitled \"Quitting Smoking. \"  Reviewed handout with pt addressing dangers of smoking, developing coping skills, and providing basic information about quitting. Pt response to counseling:  good    Admission summary: Patient reports that she is currently suicidal. Patient asked if she could contract for safety and patient stated \"No, I think that window could do some damage if I'm left alone\". Dr. Marygrace Sicard updated. 1:1 order received.            Bushra Belle RN

## 2022-10-07 NOTE — ED TRIAGE NOTES
Pt comes to ED with c/o suicidal ideation and plan. Pt states that she has been suicidal for a while and had some life events occur that worsened the depression and has suicidal thoughts with a plan to use a gun she has at home. Pt goes by Jose Luis Lea and uses pronouns she and her. Patient placed in safe room that is ligature resistant with continuous monitoring in place. Provider notified, requested an assessment by behavioral health . Patient belongings secured in a locked lockers outside of the room. Explained suicide prevention precautions to the patient including constant observer.

## 2022-10-07 NOTE — ED NOTES
Pt and vs reassessed. RR easy and unlabored. Pt resting on ground in safe room alert. Provider from psych dept at bedside assessing pt. Pt denies any needs and is stable at this time.  Continuous monitoring in place by [place] police     Flip JudgeEn[facility]  10/07/22 [id number]

## 2022-10-08 PROCEDURE — 1240000000 HC EMOTIONAL WELLNESS R&B

## 2022-10-08 PROCEDURE — 6370000000 HC RX 637 (ALT 250 FOR IP): Performed by: PSYCHIATRY & NEUROLOGY

## 2022-10-08 RX ORDER — PROGESTERONE 100 MG/1
200 CAPSULE ORAL DAILY
Status: DISCONTINUED | OUTPATIENT
Start: 2022-10-08 | End: 2022-10-11 | Stop reason: HOSPADM

## 2022-10-08 RX ORDER — FINASTERIDE 5 MG/1
5 TABLET, FILM COATED ORAL DAILY
COMMUNITY

## 2022-10-08 RX ORDER — BUPROPION HYDROCHLORIDE 150 MG/1
150 TABLET ORAL DAILY
Status: DISCONTINUED | OUTPATIENT
Start: 2022-10-08 | End: 2022-10-10

## 2022-10-08 RX ORDER — ESCITALOPRAM OXALATE 20 MG/1
20 TABLET ORAL DAILY
Status: DISCONTINUED | OUTPATIENT
Start: 2022-10-08 | End: 2022-10-11 | Stop reason: HOSPADM

## 2022-10-08 RX ORDER — FINASTERIDE 5 MG/1
5 TABLET, FILM COATED ORAL DAILY
Status: DISCONTINUED | OUTPATIENT
Start: 2022-10-08 | End: 2022-10-11 | Stop reason: HOSPADM

## 2022-10-08 RX ADMIN — ESCITALOPRAM OXALATE 20 MG: 20 TABLET ORAL at 14:40

## 2022-10-08 RX ADMIN — BUPROPION HYDROCHLORIDE 150 MG: 150 TABLET, FILM COATED, EXTENDED RELEASE ORAL at 14:40

## 2022-10-08 RX ADMIN — FINASTERIDE 5 MG: 5 TABLET, FILM COATED ORAL at 14:40

## 2022-10-08 RX ADMIN — TRAZODONE HYDROCHLORIDE 50 MG: 50 TABLET ORAL at 21:14

## 2022-10-08 RX ADMIN — PROGESTERONE 200 MG: 100 CAPSULE ORAL at 14:40

## 2022-10-08 ASSESSMENT — PAIN SCALES - GENERAL
PAINLEVEL_OUTOF10: 0
PAINLEVEL_OUTOF10: 0

## 2022-10-08 NOTE — BH NOTE
One-to-one maintained Yes  Mental status assessment Asleep  Patient response to interventions NA  Criteria for discontinuation of 1:1 No suicidal thoughts or actions  Rational for continuance of 1:1 precautions Suicidal thoughts or actions.

## 2022-10-08 NOTE — PLAN OF CARE
Problem: Discharge Planning  Goal: Discharge to home or other facility with appropriate resources  Outcome: Progressing     Problem: Depression  Goal: Will be euthymic at discharge  Description: INTERVENTIONS:  1. Administer medication as ordered  2. Provide emotional support via 1:1 interaction with staff  3. Encourage involvement in milieu/groups/activities  4. Monitor for social isolation  Outcome: Not Progressing  Note: Pt is flat, withdrawn and irritable     Problem: Psychosis  Goal: Will report no hallucinations or delusions  Description: INTERVENTIONS:  1. Administer medication as  ordered  2. Assist with reality testing to support increasing orientation  3.  Assess if patient's hallucinations or delusions are encouraging self harm or harm to others and intervene as appropriate  Outcome: Not Progressing  Note: Pt remains paranoid

## 2022-10-08 NOTE — BH NOTE
One-to-one maintained Yes  Mental status assessment Sleeping  Patient response to interventions Resting  Criteria for discontinuation of 1:1 No suicidal thoughts or plan  Rational for continuance of 1:1 precautions Continued suicidal thoughts

## 2022-10-08 NOTE — PROGRESS NOTES
One-to-one maintained Yes  Mental status assessment eating  Patient response to interventions NA  Criteria for discontinuation of 1:1 No suicidal behaviors.   Rational for continuance of 1:1 precautions Continued suicidal thoughts

## 2022-10-08 NOTE — PLAN OF CARE
Problem: Depression  Goal: Will be euthymic at discharge  Description: INTERVENTIONS:  1. Administer medication as ordered  2. Provide emotional support via 1:1 interaction with staff  3. Encourage involvement in milieu/groups/activities  4. Monitor for social isolation  Outcome: Not Progressing  Note: Pt is flat, withdrawn and irritable     Problem: Psychosis  Goal: Will report no hallucinations or delusions  Description: INTERVENTIONS:  1. Administer medication as  ordered  2. Assist with reality testing to support increasing orientation  3. Assess if patient's hallucinations or delusions are encouraging self harm or harm to others and intervene as appropriate  Outcome: Not Progressing  Note: Pt remains paranoid     Problem: Anxiety  Goal: Will report anxiety at manageable levels  Description: INTERVENTIONS:  1. Administer medication as ordered  2. Teach and rehearse alternative coping skills  3. Provide emotional support with 1:1 interaction with staff  Outcome: Progressing     Problem: Depression  Goal: Will be euthymic at discharge  Description: INTERVENTIONS:  1. Administer medication as ordered  2. Provide emotional support via 1:1 interaction with staff  3. Encourage involvement in milieu/groups/activities  4. Monitor for social isolation  Outcome: Not Progressing  Note: Pt is flat, withdrawn and irritable     Problem: Psychosis  Goal: Will report no hallucinations or delusions  Description: INTERVENTIONS:  1. Administer medication as  ordered  2. Assist with reality testing to support increasing orientation  3. Assess if patient's hallucinations or delusions are encouraging self harm or harm to others and intervene as appropriate  Outcome: Not Progressing  Note: Pt remains paranoid   Care plan reviewed with patient.   Patient does not verbalize understanding of the plan of care and does not contribute to goal setting

## 2022-10-08 NOTE — BH NOTE
One-to-one maintained Yes  Mental status assessment Resting  Patient response to interventions NA  Criteria for discontinuation of 1:1 No suicidal thoughts or actions  Rational for continuance of 1:1 precautions Continues with suicidal thoughts and plan.

## 2022-10-08 NOTE — BH NOTE
One-to-one maintained Yes   Mental status assessment Asleep  Patient response to interventions Resting  Criteria for discontinuation of 1:1 No suicidal thoughts  Rational for continuance of 1:1 precautions Continued suicidal thoughts.

## 2022-10-08 NOTE — BH NOTE
One-to-one maintained Yes  Mental status assessment Alert  Patient response to interventions Cooperative  Criteria for discontinuation of 1:1 Absence of suicidal thoughts  Rational for continuance of 1:1 precautions Continued suicidal thoughts. Patients bed linens were removed and patient given security blanket. Patient verbalizes understanding of 1:1 precautions. Patient remains in hospital garb.

## 2022-10-08 NOTE — PROGRESS NOTES
One-to-one maintained Yes  Mental status assessment showering  Patient response to interventions NA  Criteria for discontinuation of 1:1 No suicidal behaviors.   Rational for continuance of 1:1 precautions Continued suicidal thoughts    Pt has taken all medications as ordered

## 2022-10-08 NOTE — PROGRESS NOTES
One-to-one maintained Yes  Mental status assessment resting  Patient response to interventions NA  Criteria for discontinuation of 1:1 No suicidal behaviors.   Rational for continuance of 1:1 precautions Continued suicidal thoughts

## 2022-10-08 NOTE — PATIENT CARE CONFERENCE
[facility]  Initial Interdisciplinary Treatment Plan NOTE    Review Date & Time: 10/8/22 1249    Patient was in treatment team.  See Multidisciplinary Treatment Team sheet for participants. Admission Type:   Admission Type: Voluntary    Reason for admission:  Reason for Admission: MDD      Estimated Length of Stay Update:  3-5 days   Estimated Discharge Date Update: 2-4 days     Patient Strengths/Barriers  Strengths (Must Choose Two): Support from family, Support from organized community  Barriers: Support from friends  Addictive Behavior:Addictive Behavior  In the Past 3 Months, Have You Felt or Has Someone Told You That You Have a Problem With  : None  Medical Problems:  Past Medical History:   Diagnosis Date    Depressed     Panic disorder with agoraphobia     Fear of crowded spaces. EDUCATION:   Learner Progress Toward Treatment Goals: Reviewed results and recommendations of this team, Reviewed group plan and strategies, Reviewed signs, symptoms and risk of self harm and violent behavior, and Reviewed goals and plan of care    Method: Individual    Outcome: Verbalized understanding and Demonstrated Understanding    PATIENT GOALS: get medication figured out     PLAN/TREATMENT RECOMMENDATIONS UPDATE:   What is the most important thing we can help you with while you are here? See above   Who is your support system? Father, friends, counselor  Do you have follow-up providers? Yes   Do you have the ability to pay for your medications? Yes   Where will you be residing when you leave the hospital? At home   Will need a return to work slip or FMLA paper completion? Yes       GOALS UPDATE:   Time frame for Short-Term Goals: ongoing     [facility], [facility] [address]

## 2022-10-08 NOTE — BH NOTE
One-to-one maintained Yes  Mental status assessment Resting  Patient response to interventions None  Criteria for discontinuation of 1:1 No suicidal thoughts  Rational for continuance of 1:1 precautions Continues suicidal thoughts.

## 2022-10-08 NOTE — PROGRESS NOTES
One-to-one maintained Yes  Mental status assessment sleeping  Patient response to interventions NA  Criteria for discontinuation of 1:1 No suicidal behaviors.   Rational for continuance of 1:1 precautions Continued suicidal thoughts

## 2022-10-08 NOTE — BH NOTE
One-to-one maintained Yes  Mental status assessment Asleep  Patient response to interventions NA  Criteria for discontinuation of 1:1 No suicidal thoughts or actions  Rational for continuance of 1:1 precautions Continued suicidal thoughts.

## 2022-10-08 NOTE — BH NOTE
One-to-one maintained Yes  Mental status assessment Resting  Patient response to interventions None  Criteria for discontinuation of 1:1 No suicidal thoughts or behaviors  Rational for continuance of 1:1 precautions Continued suicidal thoughts or behaviors.

## 2022-10-08 NOTE — BH NOTE
One-to-one maintained Yes  Mental status assessment Resting  Patient response to interventions None  Criteria for discontinuation of 1:1 No suicidal thoughts or actions  Rational for continuance of 1:1 precautions Continued suicidal thought or actions.

## 2022-10-08 NOTE — PROGRESS NOTES
Psychosocial Assessment    Current Level of Psychosocial Functioning     Independent                                               xxx  Dependent    Minimal Assist     Comments:      Psychosocial High Risk Factors (check all that apply)     Unable to obtain meds   Chronic illness/pain    Substance abuse   Lack of Family Support     Financial stress   Isolation                                                         xxx  Inadequate Community Resources  Suicide attempt(s)                                        xxx  Not taking medications   Victim of crime   Developmental Delay  Unable to manage personal needs    Age 72 or older   Homeless  No transportation   Readmission within 30 days  Unemployment  Traumatic Event    Family/Supports identified: father, friends and counselor     Sexual Orientation:  transgender female     Patient Strengths: connected outpatient     Patient Barriers:  isolation     Safety plan: ongoing    CMHC/MH history:                            xxx    Plan of Care:  medication management, group/individual therapies, family meetings, psycho -education, treatment team meetings to assist with stabilization    Initial Discharge Plan:  will have follow up appointment with [facility] scheduled upon discharge     Clinical Summary:  Patient is 40 yr old female who presented to ED due to suicidal thoughts. Patient shared that her support are her father, counselor and a few friends. Patient shared that she lives with her father but isnt sure what they will do as her father is in the hospital and wont be able to return to work. 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 Patient shared that she is in counseling services with Leonardo Ngo at [facility]. Patient denies AOD issues.  Patient denies AH/Vh

## 2022-10-08 NOTE — BH NOTE
One-to-one maintained Yes  Mental status assessment Resting  Patient response to interventions NA  Criteria for discontinuation of 1:1 No suicidal behaviors. Rational for continuance of 1:1 precautions Continues with suicidal behaviors.

## 2022-10-09 PROCEDURE — 1240000000 HC EMOTIONAL WELLNESS R&B

## 2022-10-09 PROCEDURE — 6370000000 HC RX 637 (ALT 250 FOR IP): Performed by: PSYCHIATRY & NEUROLOGY

## 2022-10-09 RX ADMIN — PROGESTERONE 200 MG: 100 CAPSULE ORAL at 08:38

## 2022-10-09 RX ADMIN — FINASTERIDE 5 MG: 5 TABLET, FILM COATED ORAL at 08:38

## 2022-10-09 RX ADMIN — POLYETHYLENE GLYCOL 3350 17 G: 17 POWDER, FOR SOLUTION ORAL at 19:54

## 2022-10-09 RX ADMIN — ACETAMINOPHEN 650 MG: 325 TABLET ORAL at 19:54

## 2022-10-09 RX ADMIN — HYDROXYZINE HYDROCHLORIDE 50 MG: 25 TABLET, FILM COATED ORAL at 19:54

## 2022-10-09 RX ADMIN — TRAZODONE HYDROCHLORIDE 50 MG: 50 TABLET ORAL at 19:54

## 2022-10-09 RX ADMIN — ESCITALOPRAM OXALATE 20 MG: 20 TABLET ORAL at 08:38

## 2022-10-09 RX ADMIN — BUPROPION HYDROCHLORIDE 150 MG: 150 TABLET, FILM COATED, EXTENDED RELEASE ORAL at 08:38

## 2022-10-09 ASSESSMENT — PAIN SCALES - GENERAL
PAINLEVEL_OUTOF10: 0
PAINLEVEL_OUTOF10: 0
PAINLEVEL_OUTOF10: 5

## 2022-10-09 ASSESSMENT — PAIN DESCRIPTION - ORIENTATION: ORIENTATION: OTHER (COMMENT)

## 2022-10-09 ASSESSMENT — PAIN DESCRIPTION - DESCRIPTORS: DESCRIPTORS: ACHING

## 2022-10-09 ASSESSMENT — PAIN DESCRIPTION - LOCATION: LOCATION: GENERALIZED

## 2022-10-09 ASSESSMENT — PAIN - FUNCTIONAL ASSESSMENT: PAIN_FUNCTIONAL_ASSESSMENT: ACTIVITIES ARE NOT PREVENTED

## 2022-10-09 NOTE — BH NOTE
One-to-one maintained Yes  Mental status assessment Asleep  Patient response to interventions NA  Criteria for discontinuation of 1:1 No suicidal behaviors  Rational for continuance of 1:1 precautions Suicidal behaviors.

## 2022-10-09 NOTE — PROGRESS NOTES
Daily Progress Note  Oneil Powell MD  10/9/2022    Reviewed patient's current plan of care and vital signs with nursing staff. Sleep:  7.5 hours last night  Attending groups: No  She was having fleeting suicidal thoughts but was able to contract some for safety last night. She is still on one-to-one. She felt that the world was against her. No interaction with peers & staff. Mood 8 on a scale of 1 to 10 with 10 is feeling normal but incongruent; she denies suicidal thoughts. She is concerned that she is not able to get Biktarvy while on this unit since it is not on formulary and she does not have someone to bring it to her from home. SUBJECTIVE:    Patient is feeling better. SUICIDAL IDEATION denies suicidal ideation. Patient does not have medication side effects. ROS: Patient has new complaints:  No  Sleeping adequately:  Yes  Visitors: No    Mental Status Examination:  Patient is cooperative. Speech: Normal rate and tone. No abnormal movements, tics or mannerisms. Mood dysthymic; affect restricted. Suicidal ideation Absent. Homicidal ideations Absent. Hallucinations Absent. Delusions Absent. Thought Content: normal. Thought Processes: Goal-Directed. Alert and oriented X 3. Attention and concentration fair. MEMORY intact. Insight and Judgement limited. Data   height is 5' 10\" (1.778 m) and weight is 175 lb (79.4 kg). Her oral temperature is 97.9 °F (36.6 °C). Her blood pressure is 101/69 and her pulse is 59. Her respiration is 14 and oxygen saturation is 96%.    Labs:   Admission on 10/07/2022   Component Date Value Ref Range Status    WBC 10/07/2022 7.8  4.8 - 10.8 thou/mm3 Final    RBC 10/07/2022 4.48 (A)  4.70 - 6.10 mill/mm3 Final    Hemoglobin 10/07/2022 15.0  14.0 - 18.0 gm/dl Final    Hematocrit 10/07/2022 41.9 (A)  42.0 - 52.0 % Final    MCV 10/07/2022 93.5  80.0 - 94.0 fL Final    MCH 10/07/2022 33.5 (A)  26.0 - 33.0 pg Final    MCHC 10/07/2022 35.8 (A)  32.2 - 35.5 gm/dl Final RDW-CV 10/07/2022 12.2  11.5 - 14.5 % Final    RDW-SD 10/07/2022 42.1  35.0 - 45.0 fL Final    Platelets 87/44/7796 326  130 - 400 thou/mm3 Final    MPV 10/07/2022 9.3 (A)  9.4 - 12.4 fL Final    Seg Neutrophils 10/07/2022 59.0  % Final    Lymphocytes 10/07/2022 30.3  % Final    Monocytes 10/07/2022 8.1  % Final    Eosinophils 10/07/2022 1.8  % Final    Basophils 10/07/2022 0.4  % Final    Immature Granulocytes 10/07/2022 0.4  % Final    Segs Absolute 10/07/2022 4.6  1.8 - 7.7 thou/mm3 Final    Lymphocytes Absolute 10/07/2022 2.4  1.0 - 4.8 thou/mm3 Final    Monocytes Absolute 10/07/2022 0.6  0.4 - 1.3 thou/mm3 Final    Eosinophils Absolute 10/07/2022 0.1  0.0 - 0.4 thou/mm3 Final    Basophils Absolute 10/07/2022 0.0  0.0 - 0.1 thou/mm3 Final    Immature Grans (Abs) 10/07/2022 0.03  0.00 - 0.07 thou/mm3 Final    nRBC 10/07/2022 0  /100 wbc Final    Performed at [address] [place] [id number]    Glucose 10/07/2022 101  70 - 108 mg/dL Final    Creatinine 10/07/2022 0.6  0.4 - 1.2 mg/dL Final    BUN 10/07/2022 11  7 - 22 mg/dL Final    Sodium 10/07/2022 136  135 - 145 meq/L Final    Potassium 10/07/2022 4.2  3.5 - 5.2 meq/L Final    Chloride 10/07/2022 103  98 - 111 meq/L Final    CO2 10/07/2022 21 (A)  23 - 33 meq/L Final    Calcium 10/07/2022 9.3  8.5 - 10.5 mg/dL Final    AST 10/07/2022 23  5 - 40 U/L Final    Alkaline Phosphatase 10/07/2022 69  38 - 126 U/L Final    Total Protein 10/07/2022 7.0  6.1 - 8.0 g/dL Final    Albumin 10/07/2022 3.9  3.5 - 5.1 g/dL Final    Total Bilirubin 10/07/2022 0.4  0.3 - 1.2 mg/dL Final    ALT 10/07/2022 22  11 - 66 U/L Final    Performed at [address] [place] [address], [address]    Acetaminophen Level 10/07/2022 < 5.0  0.0 - 20.0 ug/mL Final    Performed at [address] [place] [address], [address]    Salicylate, Serum 80/18/1129 < 0.3 (A)  2.0 - 10.0 mg/dL Final    Performed at [address] [place] [id number]    ETHYL ALCOHOL, SERUM 10/07/2022 < 0.01  0.00 % Final    Performed at [facility], [address]    AMPHETAMINE+METHAMPHETAMINE URINE * 10/07/2022 Negative  NEGATIVE Final    Barbiturate Quant, Ur 10/07/2022 Negative  NEGATIVE Final    Benzodiazepine Quant, Ur 10/07/2022 Negative  NEGATIVE Final    Cannabinoid Quant, Ur 10/07/2022 Negative  NEGATIVE Final    Cocaine Metab Quant, Ur 10/07/2022 Negative  NEGATIVE Final    Opiates, Urine 10/07/2022 Negative  NEGATIVE Final    Oxycodone 10/07/2022 Negative  NEGATIVE Final    PCP Quant, Ur 10/07/2022 Negative  NEGATIVE Final    Comment: A \"Negative\" result for a drug abuse screen test indicates     that the drug concentration is below the following cutoffs:            Amphetamine/Methamphetamine     1000 ng/ml            Barbiturate                      200 ng/ml            Benzodiazapine                   200 ng/ml            Cannabinoids                      50 ng/ml            Cocaine Metabolite               300 ng/ml            Opiates                          300 ng/ml            Oxycodone                        100 ng/ml            Phencyclidine                     25 ng/ml  A \"Positive\" result for a drug abuse screen test should be     considered presumptive positive until/unless confirmed     by another method. (Additional request)  Quantitative values from a reference laboratory are     available upon additional request.  These results are for medical use only.   Performed at [facility], [address]      Anion Gap 10/07/2022 12.0  8.0 - 16.0 meq/L Final    Comment: ANION GAP = Sodium -(Chloride + CO2)  Performed at [address] [place] [address], [address]      Sisto Morelle Rate 10/07/2022 >90  ml/min/1.73m2 Final    Comment: Stage Description                    GFR, ml/min/1.73 m2   -   At increased risk               > or = 60 (with chronic kidney disease risk factors)   1   Normal or increased GFR         > or = 90   2   Mildly or decreased GFR         60 - 89   3   Moderately decreased GFR        30 - 59   4   Severely decreased GFR          15 - 29   5   Kidney failure                  <15 (or dialysis)  Estimated GFR calculated using abbreviated MDRD formula as  recommended by [employer]. Calculation based  upon serum creatinine and adjusted for age, gender & race. Marta. Internal Med., Vol. 139 (2) pg 137-147. Performed at [address] [place] [address], [address]      Osmolality Calc 10/07/2022 271.5 (A)  275.0 - 300.0 mOsmol/kg Final    Performed at [address] [place] [address], [address]            Medications  Current Facility-Administered Medications: bictegravir-emtricitab-tenofovir alafenamide (BIKTARVY) -25 MG per tablet 1 tablet (Patient Supplied), 1 tablet, Oral, Daily  buPROPion (WELLBUTRIN XL) extended release tablet 150 mg, 150 mg, Oral, Daily  escitalopram (LEXAPRO) tablet 20 mg, 20 mg, Oral, Daily  finasteride (PROSCAR) tablet 5 mg, 5 mg, Oral, Daily  progesterone (PROMETRIUM) capsule 200 mg, 200 mg, Oral, Daily  acetaminophen (TYLENOL) tablet 650 mg, 650 mg, Oral, Q4H PRN  ibuprofen (ADVIL;MOTRIN) tablet 400 mg, 400 mg, Oral, Q6H PRN  polyethylene glycol (GLYCOLAX) packet 17 g, 17 g, Oral, Daily PRN  hydrOXYzine HCl (ATARAX) tablet 50 mg, 50 mg, Oral, TID PRN  traZODone (DESYREL) tablet 50 mg, 50 mg, Oral, Nightly PRN  nicotine (NICODERM CQ) 21 MG/24HR 1 patch, 1 patch, TransDERmal, Daily  ziprasidone (GEODON) 10 mg in sterile water 0.5 mL injection, 10 mg, IntraMUSCular, TID PRN    ASSESSMENT  MDD (major depressive disorder), recurrent severe, without psychosis ([facility] [address]     PLAN  Patient's symptoms are improving  Continue with current medications. Attempt to develop insight  Psycho-education conducted. Supportive Therapy conducted. Discontinue one-to-one, start line of sight.

## 2022-10-09 NOTE — BH NOTE
One-to-one maintained Yes  Mental status assessment Alert and oriented times 4  Patient response to interventions Appropriate  Criteria for discontinuation of 1:1 No suicidal behaviors  Rational for continuance of 1:1 precautions Continued suicidal thoughts.

## 2022-10-09 NOTE — BH NOTE
One-to-one maintained Yes  Mental status assessment Alert and oriented times 4  Patient response to interventions Responds appropriately  Criteria for discontinuation of 1:1 No suicidal thoughts or actions  Rational for continuance of 1:1 precautions Continues with suicidal thoughts.

## 2022-10-09 NOTE — PLAN OF CARE
Problem: Depression  Goal: Will be euthymic at discharge  Description: INTERVENTIONS:  1. Administer medication as ordered  2. Provide emotional support via 1:1 interaction with staff  3. Encourage involvement in milieu/groups/activities  4. Monitor for social isolation  Outcome: Progressing  Note: Patient states she feel depressed and hopeless. Patient noted with a blunt affect. Problem: Psychosis  Goal: Will report no hallucinations or delusions  Description: INTERVENTIONS:  1. Administer medication as  ordered  2. Assist with reality testing to support increasing orientation  3. Assess if patient's hallucinations or delusions are encouraging self harm or harm to others and intervene as appropriate  Outcome: Progressing  Note: Patient states she feels paranoid that the universe is against her. Problem: Anxiety  Goal: Will report anxiety at manageable levels  Description: INTERVENTIONS:  1. Administer medication as ordered  2. Teach and rehearse alternative coping skills  3. Provide emotional support with 1:1 interaction with staff  Outcome: Progressing  Flowsheets  Taken 10/9/2022 0233  Will report anxiety at manageable levels: Teach and rehearse alternative coping skills  Taken 10/9/2022 0226  Will report anxiety at manageable levels: Teach and rehearse alternative coping skills  Note: Patient states she continues with moderate anxiety at present. Problem: Discharge Planning  Goal: Discharge to home or other facility with appropriate resources  Outcome: Progressing  Flowsheets (Taken 10/9/2022 0233)  Discharge to home or other facility with appropriate resources: Identify barriers to discharge with patient and caregiver  Note: Patient states she will return home at discharge. Care plan reviewed with patient.   Patient does verbalize understanding of the plan of care and does contribute to goal setting

## 2022-10-09 NOTE — PROGRESS NOTES
One-to-one maintained Yes  Mental status assessment awake  Patient response to interventions NA  Criteria for discontinuation of 1:1 No suicidal behaviors.   Rational for continuance of 1:1 precautions Continued suicidal thoughts

## 2022-10-09 NOTE — H&P
[address] [place] [address]                              HISTORY AND PHYSICAL    PATIENT NAME: Michael Drummond                    :        1980  MED REC NO:   [id number]                           ROOM:       3336  ACCOUNT NO:   [de-identified]                           ADMIT DATE: 10/07/2022  PROVIDER:     Ajay Smith M.D. IDENTIFYING INFORMATION:  The patient is a 72-year-old single  transgender female. She has no children. She lives with her father. She works at [employer] CHIEF COMPLAINT:  \"My counselor felt that I wasn't fit to go home. \"    HISTORY OF PRESENT ILLNESS:  The patient presents to [address] [address] ED with her counselor from [facility] due to depression and suicidal  thoughts. She was having plan to shoot herself with a gun and she has  access to a gun at home. She reports that everything in her life  triggered suicidal thoughts. She has a long history of depression and  anxiety. At this time, she has been feeling depressed for about six  months for no specific reason. Her depression keeps getting worse. She  has been having suicidal thoughts for about two weeks. In the emergency  room, she was banging her head against the wall. Also after admission  on this unit, she wanted to hurt herself. She is on one to one. She  reports trouble staying asleep. She feels hopeless and worthless. She  has poor energy, decreased interest in pleasurable activities, and poor  concentration. Besides depression, she reports times when she feels  somewhat happy for no reason, but the symptom may last a few hours to  half a day associated with increased energy. She used to have shopping  spree in the past.  She denies any history of one-night stand. She has  been having those mood swings for the past few years. She may have them  once a week or every two weeks.   She reports hearing a voice once  telling her to hurt herself, sometime last year or early this year. She  feels anxious most of the time and she worries a lot. She has a history  of anxiety attacks, last time was in July. She denies history of abuse  or trauma. She also denies eating disorder symptoms and obsessive  compulsive disorder symptoms. PAST PSYCHIATRIC HISTORY:  This is her third [facility] psychiatric  admission. She had two psychiatric admissions in this unit in 2013. She also was admitted to [facility] two years ago. She  states she has attempted suicide at least five times either by taking an  overdose, cutting her wrist, or carbon monoxide poisoning. She had her  first depressive symptoms when she was 16 or 15 due to gender identity. She has been on psychotropics on and off since 19 to 20. She states  usually she stay on psychotropics for about six months but this time,  she states she has been compliant on her psychotropics. She has been  going to [facility] for the past few years. She has a therapist and she also  sees a psychiatrist by telemedicine. She used to go to [facility] in the  past.  She has been on fluoxetine and paroxetine in the past, but she  did not like the way she felt on them. FAMILY HISTORY:  She believes that she has a family history of  depression and anxiety, but no one talks about it. She has a family  history of suicide attempt. She reports that one of her cousin has  history of illicit drugs and alcohol. SOCIAL HISTORY:  The patient was born in [place]. She was raised all over  until she was 9years old since her father was in the service. Since  9years old, she grew up in [place]. Parents were , they   when she was in her mid 25s. Both parents live here in [place]. She is  the only child. She lives with her father, who is currently in the  hospital.  She did not have much contact with her mother. She says her  primary support are a few friends.   She graduated from high school, no  college. She has never been . She states she attempted to talk  to her parents about her gender identity when she was 7years old, but  she was shut down. She came out in her early 26s. She is a transgender  female. Since 2020, she has been on medications to start the process of  becoming a female. She has no children. She has been in different  relationships over the years, but none in the past six years. 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   Longest time, she had a job was 7 years at [employer]. She  experienced cannabis in high school, but denies other illicit drug use. Urine tox screen is negative. She has a history of alcohol in her early  25s for about 10 years. She states towards the end, she was drinking a  liter of vodka daily. She stopped drinking over two years ago. She  smoked one and a half to two pack of cigarettes every day. She had one  DUI. She does not have a spiritual life. MEDICAL AND SURGICAL HISTORY:  HIV positive. MEDICATIONS:  Finasteride, escitalopram 10 mg daily, progesterone,  vitamin D, Biktarvy, bupropion  mg daily. ALLERGIES:  NO KNOWN DRUG ALLERGIES. REVIEW OF SYSTEMS:  Ten-system review is negative except as noted above. PHYSICAL EXAMINATION:  HEENT:  Within normal limits. NECK:  Supple. No thyromegaly. CARDIOVASCULAR:  Normal sinus rhythm. No murmur or gallop on  auscultation. LUNGS:  Clear. No wheezing or rales on auscultation. ABDOMEN:  Soft. Bowel sounds are present. RECTAL/GENITAL:  Not examined. EXTREMITIES:  Full range of motion in all four extremities. Peripheral  pulses are present. NEUROLOGIC:  Cranial nerves II through XII are grossly intact. Reflexes  are equal bilaterally. MENTAL STATUS EXAMINATION:  The patient appears stated age, dressed in a  hospital gown. She has good eye contact. Fair grooming and hygiene. She is cooperative with the interview. Speech clear, coherent, and  spontaneous. Mood depressed and anxious and affect restricted. She is  suicidal with plan to shoot herself but since admission, she has been  trying to hit her head on the wall. She requires one to one. No  homicidal ideation. No psychosis. No major mood swings, flight of  ideas, and no racing thoughts. Thought process is goal oriented. She  is alert and oriented x3. She has fair attention and concentration. Memory appears to be intact as tested within the context of the  interview. Intelligence appears average. Judgment and insight are  poor. DIAGNOSES:  1.  Major depressive disorder, recurrent, severe without psychotic  features. 2.  Generalized anxiety disorder. 3.  Alcohol use disorder, in full remission. 4.  Rule out bipolar disorder type 2.  5.  HIV positive. 6.  Vitamin D deficiency. 7.  Chronic mental illness. RECOMMENDATIONS:  1. Admit to the unit. 2.  Routine labs ordered. 3.  Resume home medication, but increase escitalopram 20 mg daily, add  trazodone and hydroxyzine. Consider augmentation with aripiprazole or  Rexulti. 4.  Continue with one to one. 5.  Risks and benefits of psychotropics discussed as well as alternative  treatments. 6.  Support and reassurance given. 7.  Milieu and group therapy to develop insight to psychiatric illness  and a better coping mechanism. 8.  Upon discharge, she will be referred for outpatient management. PATIENT'S STRENGTH:  Some of her friends.         Radha Santana M.D.    D: 10/08/2022 20:47:05       T: 10/08/2022 60:22:78     [email or web address]  Job#: [id number]     Doc#: [id number]

## 2022-10-09 NOTE — BH NOTE
One-to-one maintained Yes  Mental status assessment Asleep  Patient response to interventions NA  Criteria for discontinuation of 1:1 No suicidal behaviors  Rational for continuance of 1:1 precautions Suicidal thoughts and behaviors.

## 2022-10-09 NOTE — PLAN OF CARE
Problem: Depression  Goal: Will be euthymic at discharge  Description: INTERVENTIONS:  1. Administer medication as ordered  2. Provide emotional support via 1:1 interaction with staff  3. Encourage involvement in milieu/groups/activities  4. Monitor for social isolation  10/9/2022 1041 by Srinivasan Rey RN  Outcome: Not Progressing  Note: Pt remains withdrawn and flat  10/9/2022 0233 by Cierra Grier RN  Outcome: Progressing  Note: Patient states she feel depressed and hopeless. Patient noted with a blunt affect. Problem: Psychosis  Goal: Will report no hallucinations or delusions  Description: INTERVENTIONS:  1. Administer medication as  ordered  2. Assist with reality testing to support increasing orientation  3. Assess if patient's hallucinations or delusions are encouraging self harm or harm to others and intervene as appropriate  10/9/2022 1041 by Srinivasan Rey RN  Outcome: Not Progressing  Note: Pt states that the whole world is against her, states that she feels the same as she did yesterday  10/9/2022 0233 by Cierra Grier RN  Outcome: Progressing  Note: Patient states she feels paranoid that the universe is against her. Problem: Anxiety  Goal: Will report anxiety at manageable levels  Description: INTERVENTIONS:  1. Administer medication as ordered  2. Teach and rehearse alternative coping skills  3.  Provide emotional support with 1:1 interaction with staff  10/9/2022 1041 by Srinivasan Rey RN  Outcome: Progressing  Flowsheets (Taken 10/9/2022 1013)  Will report anxiety at manageable levels: Teach and rehearse alternative coping skills  10/9/2022 0233 by Cierra Grier RN  Outcome: Progressing  Flowsheets  Taken 10/9/2022 0233  Will report anxiety at manageable levels: Teach and rehearse alternative coping skills  Taken 10/9/2022 0226  Will report anxiety at manageable levels: Teach and rehearse alternative coping skills  Note: Patient states she continues with moderate anxiety at present. Problem: Discharge Planning  Goal: Discharge to home or other facility with appropriate resources  10/9/2022 1041 by David Palumbo RN  Outcome: Progressing  Flowsheets (Taken 10/9/2022 1013)  Discharge to home or other facility with appropriate resources: Identify barriers to discharge with patient and caregiver  10/9/2022 0233 by Roger Reardon RN  Outcome: Erik Mater (Taken 10/9/2022 0233)  Discharge to home or other facility with appropriate resources: Identify barriers to discharge with patient and caregiver  Note: Patient states she will return home at discharge. Problem: Depression  Goal: Will be euthymic at discharge  Description: INTERVENTIONS:  1. Administer medication as ordered  2. Provide emotional support via 1:1 interaction with staff  3. Encourage involvement in milieu/groups/activities  4. Monitor for social isolation  10/9/2022 1041 by David Palumbo RN  Outcome: Not Progressing  Note: Pt remains withdrawn and flat  10/9/2022 0233 by Roger Reardon RN  Outcome: Progressing  Note: Patient states she feel depressed and hopeless. Patient noted with a blunt affect. Problem: Psychosis  Goal: Will report no hallucinations or delusions  Description: INTERVENTIONS:  1. Administer medication as  ordered  2. Assist with reality testing to support increasing orientation  3. Assess if patient's hallucinations or delusions are encouraging self harm or harm to others and intervene as appropriate  10/9/2022 1041 by David Palumbo RN  Outcome: Not Progressing  Note: Pt states that the whole world is against her, states that she feels the same as she did yesterday  10/9/2022 0233 by Roger Reardon RN  Outcome: Progressing  Note: Patient states she feels paranoid that the universe is against her. Care plan reviewed with patient.   Patient does verbalize understanding of the plan of care and does not contribute to goal setting

## 2022-10-09 NOTE — BH NOTE
One-to-one maintained Yes  Mental status assessment Asleep  Patient response to interventions NA  Criteria for discontinuation of 1:1 No suicidal behaviors  Rational for continuance of 1:1 precautions Suicidal thoughts or behaviors.

## 2022-10-10 PROCEDURE — 6370000000 HC RX 637 (ALT 250 FOR IP): Performed by: PSYCHIATRY & NEUROLOGY

## 2022-10-10 PROCEDURE — 1240000000 HC EMOTIONAL WELLNESS R&B

## 2022-10-10 RX ORDER — BUPROPION HYDROCHLORIDE 150 MG/1
150 TABLET ORAL ONCE
Status: COMPLETED | OUTPATIENT
Start: 2022-10-10 | End: 2022-10-10

## 2022-10-10 RX ORDER — BUPROPION HYDROCHLORIDE 300 MG/1
300 TABLET ORAL DAILY
Status: DISCONTINUED | OUTPATIENT
Start: 2022-10-11 | End: 2022-10-11 | Stop reason: HOSPADM

## 2022-10-10 RX ADMIN — PROGESTERONE 200 MG: 100 CAPSULE ORAL at 06:37

## 2022-10-10 RX ADMIN — FINASTERIDE 5 MG: 5 TABLET, FILM COATED ORAL at 06:37

## 2022-10-10 RX ADMIN — BUPROPION HYDROCHLORIDE 150 MG: 150 TABLET, FILM COATED, EXTENDED RELEASE ORAL at 06:37

## 2022-10-10 RX ADMIN — ESCITALOPRAM OXALATE 20 MG: 20 TABLET ORAL at 06:37

## 2022-10-10 RX ADMIN — TRAZODONE HYDROCHLORIDE 50 MG: 50 TABLET ORAL at 21:05

## 2022-10-10 RX ADMIN — HYDROXYZINE HYDROCHLORIDE 50 MG: 25 TABLET, FILM COATED ORAL at 21:05

## 2022-10-10 RX ADMIN — BUPROPION HYDROCHLORIDE 150 MG: 150 TABLET, FILM COATED, EXTENDED RELEASE ORAL at 12:01

## 2022-10-10 ASSESSMENT — PAIN DESCRIPTION - FREQUENCY: FREQUENCY: INTERMITTENT

## 2022-10-10 ASSESSMENT — PAIN SCALES - GENERAL
PAINLEVEL_OUTOF10: 3
PAINLEVEL_OUTOF10: 0

## 2022-10-10 ASSESSMENT — PAIN DESCRIPTION - DESCRIPTORS: DESCRIPTORS: ACHING

## 2022-10-10 ASSESSMENT — PAIN DESCRIPTION - ORIENTATION: ORIENTATION: LEFT

## 2022-10-10 ASSESSMENT — PAIN DESCRIPTION - ONSET: ONSET: GRADUAL

## 2022-10-10 ASSESSMENT — PAIN - FUNCTIONAL ASSESSMENT: PAIN_FUNCTIONAL_ASSESSMENT: ACTIVITIES ARE NOT PREVENTED

## 2022-10-10 ASSESSMENT — PAIN DESCRIPTION - LOCATION: LOCATION: GENERALIZED;MOUTH

## 2022-10-10 NOTE — PLAN OF CARE
Problem: Depression  Goal: Will be euthymic at discharge  Description: INTERVENTIONS:  1. Administer medication as ordered  2. Provide emotional support via 1:1 interaction with staff  3. Encourage involvement in milieu/groups/activities  4. Monitor for social isolation  10/9/2022 2317 by Flora Lesches, RN  Outcome: Progressing  Note: Patient states she continues to feel moderately depressed this shift. Patient noted with a very blunted affect. 10/9/2022 1041 by Martha Ontiveros RN  Outcome: Not Progressing  Note: Pt remains withdrawn and flat     Problem: Psychosis  Goal: Will report no hallucinations or delusions  Description: INTERVENTIONS:  1. Administer medication as  ordered  2. Assist with reality testing to support increasing orientation  3. Assess if patient's hallucinations or delusions are encouraging self harm or harm to others and intervene as appropriate  10/9/2022 2317 by Flora Lesches, RN  Outcome: Progressing  Note: Patient denies any hallucinations at present but states she feels the whole universe is against her. 10/9/2022 1041 by Martha Ontiveros RN  Outcome: Not Progressing  Note: Pt states that the whole world is against her, states that she feels the same as she did yesterday     Problem: Anxiety  Goal: Will report anxiety at manageable levels  Description: INTERVENTIONS:  1. Administer medication as ordered  2. Teach and rehearse alternative coping skills  3. Provide emotional support with 1:1 interaction with staff  10/9/2022 2317 by Flora Lesches, RN  Outcome: Progressing  Flowsheets  Taken 10/9/2022 2317  Will report anxiety at manageable levels: Teach and rehearse alternative coping skills  Taken 10/9/2022 2304  Will report anxiety at manageable levels: Teach and rehearse alternative coping skills  Note: Patient states she continues to feel moderately anxious this shift.   10/9/2022 1041 by Martha Ontiveros RN  Outcome: Progressing  Flowsheets (Taken 10/9/2022 1013)  Will report anxiety at manageable levels: Teach and rehearse alternative coping skills     Problem: Discharge Planning  Goal: Discharge to home or other facility with appropriate resources  10/9/2022 2317 by Lenny Galloway RN  Outcome: Progressing  Flowsheets  Taken 10/9/2022 2317  Discharge to home or other facility with appropriate resources: Identify barriers to discharge with patient and caregiver  Taken 10/9/2022 2304  Discharge to home or other facility with appropriate resources: Identify barriers to discharge with patient and caregiver  Note: Patient states she will return home at discharge and continue to follow with her counselor at [facility]. 10/9/2022 1041 by Jean Reyes RN  Outcome: Progressing  Flowsheets (Taken 10/9/2022 1013)  Discharge to home or other facility with appropriate resources: Identify barriers to discharge with patient and caregiver   Care plan reviewed with patient. Patient does verbalize understanding of the plan of care and does not contribute to goal setting   Problem: Depression  Goal: Will be euthymic at discharge  Description: INTERVENTIONS:  1. Administer medication as ordered  2. Provide emotional support via 1:1 interaction with staff  3. Encourage involvement in milieu/groups/activities  4. Monitor for social isolation  10/9/2022 2317 by Lenny Galloway RN  Outcome: Progressing  Note: Patient states she continues to feel moderately depressed this shift. Patient noted with a very blunted affect. 10/9/2022 1041 by Jean Reyes RN  Outcome: Not Progressing  Note: Pt remains withdrawn and flat     Problem: Psychosis  Goal: Will report no hallucinations or delusions  Description: INTERVENTIONS:  1. Administer medication as  ordered  2. Assist with reality testing to support increasing orientation  3.  Assess if patient's hallucinations or delusions are encouraging self harm or harm to others and intervene as appropriate  10/9/2022 2317 by Lenny Galloway RN  Outcome: Progressing  Note: Patient denies any hallucinations at present but states she feels the whole universe is against her.   10/9/2022 1041 by Nasir Bach RN  Outcome: Not Progressing  Note: Pt states that the whole world is against her, states that she feels the same as she did yesterday

## 2022-10-10 NOTE — PROGRESS NOTES
This RN has reviewed and agrees with Carlene WARD LPN's data collection and has collaborated with this LPN regarding the patient's care plan.

## 2022-10-10 NOTE — PLAN OF CARE
Problem: Depression  Goal: Will be euthymic at discharge  Description: INTERVENTIONS:  1. Administer medication as ordered  2. Provide emotional support via 1:1 interaction with staff  3. Encourage involvement in milieu/groups/activities  4. Monitor for social isolation  10/10/2022 1158 by Martha Knowles LPN  Outcome: Progressing  10/9/2022 2317 by Lenny Galloway RN  Outcome: Progressing  Note: Patient states she continues to feel moderately depressed this shift. Patient noted with a very blunted affect. Problem: Psychosis  Goal: Will report no hallucinations or delusions  Description: INTERVENTIONS:  1. Administer medication as  ordered  2. Assist with reality testing to support increasing orientation  3. Assess if patient's hallucinations or delusions are encouraging self harm or harm to others and intervene as appropriate  10/10/2022 1158 by Martha Knowles LPN  Outcome: Progressing  10/9/2022 2317 by Lenny Galloway RN  Outcome: Progressing  Note: Patient denies any hallucinations at present but states she feels the whole universe is against her. Problem: Anxiety  Goal: Will report anxiety at manageable levels  Description: INTERVENTIONS:  1. Administer medication as ordered  2. Teach and rehearse alternative coping skills  3.  Provide emotional support with 1:1 interaction with staff  10/10/2022 1158 by Martha Knowles LPN  Outcome: Progressing  Flowsheets (Taken 10/10/2022 1153)  Will report anxiety at manageable levels:   Administer medication as ordered   Teach and rehearse alternative coping skills   Provide emotional support with 1:1 interaction with staff  10/9/2022 2317 by Lenny Galloway RN  Outcome: Progressing  Flowsheets  Taken 10/9/2022 2317  Will report anxiety at manageable levels: Teach and rehearse alternative coping skills  Taken 10/9/2022 2304  Will report anxiety at manageable levels: Teach and rehearse alternative coping skills  Note: Patient states she continues to feel moderately anxious this shift. Problem: Discharge Planning  Goal: Discharge to home or other facility with appropriate resources  10/10/2022 1158 by Jocelyn Yousif LPN  Outcome: Progressing  Flowsheets (Taken 10/10/2022 1153)  Discharge to home or other facility with appropriate resources:   Identify barriers to discharge with patient and caregiver   Identify discharge learning needs (meds, wound care, etc)   Arrange for needed discharge resources and transportation as appropriate  10/9/2022 2317 by Dulce Mckeon RN  Outcome: Progressing  Flowsheets  Taken 10/9/2022 2317  Discharge to home or other facility with appropriate resources: Identify barriers to discharge with patient and caregiver  Taken 10/9/2022 2304  Discharge to home or other facility with appropriate resources: Identify barriers to discharge with patient and caregiver  Note: Patient states she will return home at discharge and continue to follow with her counselor at [facility]. Care plan reviewed with patient. Patient verbalize understanding of the plan of care and contribute to goal setting.

## 2022-10-10 NOTE — PROGRESS NOTES
Discharge planning-Francesca is scheduled for a follow up appointment with Sheridan Esposito on 10/21/22 at 12:00 pm at [facility].

## 2022-10-10 NOTE — BH NOTE
INPATIENT RECREATIONAL THERAPY  ADULT BEHAVIORAL SERVICES  EVALUATION    REFERRING PHYSICIAN:  Dr. Nasir Connelly  DIAGNOSIS:   Major Depressive Disorder, Recurrent Severe, with Psychosis  PRECAUTIONS:  Standard precautions/HIV+    HISTORY OF PRESENT ILLNESS/INJURY:  Patient was admitted to the unit due to suicidal ideation and depression. Patient also reported visual hallucinations. Patient reported a plan to shoot herself with a gun prior to admission. Patient reported an increase in her depression over the past 6 months. Patient has been banging her head against the wall. Patient reported anxiety, poor sleep and poor concentration. Patient likes to be called \"Francesca. \"    PMH:  Please see medical chart for prior medical history, allergies, and medication    HISTORY OF PSYCHIATRIC TREATMENT:  IP:  [facility]  OP: [facility]    DATE OF BIRTH:  [id number]  GENDER:   Patient is a transgender female. MARITAL STATUS:  single    EMPLOYMENT STATUS:   Employed at [employer] as a . LIVING SITUATION/SUPPORT:  Lives with her father. EDUCATIONAL LEVEL:    graduate    MEDICATION/DRUG USE:  Overdose in the past.   History of marijuana use. History of alcohol abuse. Patient has been medication compliant. LEISURE INTERESTS:  playing Video games, drawing, reading magazines, cooking, shopping, watching TV and movies, listening to music, family activities, activities with friends  ACTIVITY PREFERENCE:   Individual or 1:1 preferred  ACTIVITY TYPES:   Passive. Indoor. Outdoor. Active. COGNITION:   A&Ox4    COPING:   poor  ATTENTION:  reported poor concentration  RELAXATION:  Patient reported anxiety, panic attacks and poor sleep.   SELF-ESTEEM:   poor  MOTIVATION:    poor - no insight    SOCIAL SKILLS:    fair - isolating in room  FRUSTRATION TOLERANCE:   poor - history of cutting  ATTENTION SEEKING:  history of cutting  COOPERATION:   cooperative - isolating in room  AFFECT: flat  APPEARANCE:  fair    HEARING:    no problems  VISION:  no problems   VERBAL COMMUNICATION:  no problems  WRITTEN COMMUNICATION:   no problems    COORDINATION:  No problems noted  MOBILITY:  Ambulates independently   GOALS:  Identify 2 new positive coping skills by time of discharge.

## 2022-10-10 NOTE — PROGRESS NOTES
[address] [place] [address]  Day 3 Interdisciplinary Treatment Plan NOTE    Review Date & Time: 10/10/22  1300    Patient was in treatment team    Admission Type:   Admission Type: Voluntary    Reason for admission:  Reason for Admission: MDD  Estimated Length of Stay Update:  10/14/22  Estimated Discharge Date Update: 1-2 days    Patient Strengths/Barriers  Strengths (Must Choose Two): Support from family, Support from organized community  Barriers: Support from friends  Addictive Behavior:Addictive Behavior  In the Past 3 Months, Have You Felt or Has Someone Told You That You Have a Problem With  : None  Medical Problems:  Past Medical History:   Diagnosis Date    Depressed     Panic disorder with agoraphobia     Fear of crowded spaces. Risk:  Fall Risk   Uri Scale Uri Scale Score: 21    Status EXAM:   Mental Status and Behavioral Exam  Normal: No  Level of Assistance: Independent/Self  Facial Expression: Flat  Affect: Blunt  Level of Consciousness: Alert  Frequency of Checks: 4 times per hour, close  Mood:Normal: No  Mood: Depressed, Anxious, Ambivalent, Empty, Sad, Worthless, low self-esteem  Motor Activity:Normal: No  Motor Activity: Decreased  Eye Contact: Fair  Observed Behavior: Cooperative  Sexual Misconduct History: Current - no  Preception: [place] to person, [place] to time, [place] to place, [place] to situation  Attention:Normal: No  Attention: Distractible  Thought Processes: Circumstantial  Thought Content:Normal: No  Thought Content: Paranoia (Feels the whole universe is against her.)  Depression Symptoms: Feelings of hopelessess, Feelings of helplessness, Feelings of worthlessness, Isolative, Loss of interest  Anxiety Symptoms: Generalized  Yandy Symptoms: No problems reported or observed.   Hallucinations: None  Delusions: Yes  Delusions: Paranoid  Memory:Normal: No  Memory: Poor recent  Insight and Judgment: No  Insight and Judgment: Poor judgment, Poor insight    Daily Assessment Last Entry:   Daily Sleep (WDL): Within Defined Limits            Daily Nutrition (WDL): Within Defined Limits  Appetite Change: Normal for patient  Barriers to Nutrition: None  Level of Assistance: Independent/Self    Patient Monitoring:  Frequency of Checks: 4 times per hour, close    Psychiatric Symptoms:   Depression Symptoms  Depression Symptoms: Feelings of hopelessess, Feelings of helplessness, Feelings of worthlessness, Isolative, Loss of interest  Anxiety Symptoms  Anxiety Symptoms: Generalized  Yandy Symptoms  Yandy Symptoms: No problems reported or observed. Suicide Risk CSSR-S:  1) Within the past month, have you wished you were dead or wished you could go to sleep and not wake up? : Yes  2) Have you actually had any thoughts of killing yourself? : Yes  3) Have you been thinking about how you might kill yourself? : Yes  5) Have you started to work out or worked out the details of how to kill yourself? Do you intend to carry out this plan? : Yes  6) Have you ever done anything, started to do anything, or prepared to do anything to end your life?: Yes    EDUCATION:   Learner Progress Toward Treatment Goals: Reviewed results and recommendations of this team and Reviewed group plan and strategies    Method: Individual    Outcome: Verbalized understanding and Demonstrated Understanding    PATIENT GOALS: Get on medication    PLAN/TREATMENT RECOMMENDATIONS UPDATE:  How are you progressing toward meeting your main treatment goal? Pt feels that the mood is much improved, pt is looking forward to being discharged home. 2.  Are there discharge barriers/lingering problems that need to be addressed? None. 3.  Do you have the ability to pay for your medications? Yes      4. How is your group participation? Not attending. Isolating to room.      GOALS UPDATE:   Time frame for Short-Term Goals: Daily      MISAEL Dailey

## 2022-10-10 NOTE — PROGRESS NOTES
Daily Progress Note  Devang Sanon MD  10/10/2022    Reviewed patient's current plan of care and vital signs with nursing staff. Sleep:  7 hours last night broken  Attending groups: No  She told staff last night that she had suicidal thoughts and will not tell them how she she plans to hurt herself; she was not able to contract for safety. She is in line of sight. Mood 5 on a scale of 1 to 10 with 10 is feeling normal.  Patient reports that she had a low period of depression with suicidal thoughts last night; she denies being suicidal at this time. She would like to be discharged sooner, she has not been on her HIV medication which is not on formulary. SUBJECTIVE:    Patient is feeling better. SUICIDAL IDEATION denies suicidal ideation. Patient does not have medication side effects. ROS: Patient has new complaints:  No  Sleeping adequately:  Yes  Visitors: No    Mental Status Examination:  Patient is cooperative. Speech: Normal rate and tone. No abnormal movements, tics or mannerisms. Mood dysthymic; affect restricted. Suicidal ideation Absent. Homicidal ideations Absent. Hallucinations Absent. Delusions Absent. Thought Content: normal. Thought Processes: Goal-Directed. Alert and oriented X 3. Attention and concentration fair. MEMORY intact. Insight and Judgement limited. Data   height is 5' 10\" (1.778 m) and weight is 175 lb (79.4 kg). Her oral temperature is 97.9 °F (36.6 °C). Her blood pressure is 110/59 (abnormal) and her pulse is 67. Her respiration is 16 and oxygen saturation is 98%.    Labs:            Medications  Current Facility-Administered Medications: bictegravir-emtricitab-tenofovir alafenamide (BIKTARVY) -25 MG per tablet 1 tablet (Patient Supplied), 1 tablet, Oral, Daily  buPROPion (WELLBUTRIN XL) extended release tablet 150 mg, 150 mg, Oral, Daily  escitalopram (LEXAPRO) tablet 20 mg, 20 mg, Oral, Daily  finasteride (PROSCAR) tablet 5 mg, 5 mg, Oral, Daily  progesterone (PROMETRIUM) capsule 200 mg, 200 mg, Oral, Daily  acetaminophen (TYLENOL) tablet 650 mg, 650 mg, Oral, Q4H PRN  ibuprofen (ADVIL;MOTRIN) tablet 400 mg, 400 mg, Oral, Q6H PRN  polyethylene glycol (GLYCOLAX) packet 17 g, 17 g, Oral, Daily PRN  hydrOXYzine HCl (ATARAX) tablet 50 mg, 50 mg, Oral, TID PRN  traZODone (DESYREL) tablet 50 mg, 50 mg, Oral, Nightly PRN  nicotine (NICODERM CQ) 21 MG/24HR 1 patch, 1 patch, TransDERmal, Daily  ziprasidone (GEODON) 10 mg in sterile water 0.5 mL injection, 10 mg, IntraMUSCular, TID PRN    ASSESSMENT  MDD (major depressive disorder), recurrent severe, [facility]     PLAN  Patient's symptoms are improving  Continue with current medications, increase bupropion XL. Attempt to develop insight  Psycho-education conducted. Supportive Therapy conducted. Probable discharge is tomorrow  Follow-up @ [facility].

## 2022-10-11 VITALS
SYSTOLIC BLOOD PRESSURE: 112 MMHG | HEART RATE: 62 BPM | RESPIRATION RATE: 18 BRPM | HEIGHT: 70 IN | TEMPERATURE: 97.7 F | WEIGHT: 175 LBS | OXYGEN SATURATION: 98 % | DIASTOLIC BLOOD PRESSURE: 66 MMHG | BODY MASS INDEX: 25.05 KG/M2

## 2022-10-11 PROCEDURE — 6370000000 HC RX 637 (ALT 250 FOR IP): Performed by: PSYCHIATRY & NEUROLOGY

## 2022-10-11 PROCEDURE — 5130000000 HC BRIDGE APPOINTMENT

## 2022-10-11 RX ORDER — TRAZODONE HYDROCHLORIDE 50 MG/1
50 TABLET ORAL NIGHTLY PRN
Qty: 30 TABLET | Refills: 0 | Status: SHIPPED | OUTPATIENT
Start: 2022-10-11

## 2022-10-11 RX ORDER — ESCITALOPRAM OXALATE 20 MG/1
20 TABLET ORAL DAILY
Qty: 30 TABLET | Refills: 0 | Status: SHIPPED | OUTPATIENT
Start: 2022-10-12

## 2022-10-11 RX ORDER — BUPROPION HYDROCHLORIDE 300 MG/1
300 TABLET ORAL DAILY
Qty: 30 TABLET | Refills: 0 | Status: SHIPPED | OUTPATIENT
Start: 2022-10-12

## 2022-10-11 RX ORDER — HYDROXYZINE 50 MG/1
50 TABLET, FILM COATED ORAL 3 TIMES DAILY PRN
Qty: 90 TABLET | Refills: 0 | Status: SHIPPED | OUTPATIENT
Start: 2022-10-11 | End: 2022-11-10

## 2022-10-11 RX ADMIN — BUPROPION HYDROCHLORIDE 300 MG: 300 TABLET, FILM COATED, EXTENDED RELEASE ORAL at 09:01

## 2022-10-11 RX ADMIN — FINASTERIDE 5 MG: 5 TABLET, FILM COATED ORAL at 09:01

## 2022-10-11 RX ADMIN — ESCITALOPRAM OXALATE 20 MG: 20 TABLET ORAL at 09:01

## 2022-10-11 RX ADMIN — PROGESTERONE 200 MG: 100 CAPSULE ORAL at 09:01

## 2022-10-11 ASSESSMENT — PAIN SCALES - GENERAL: PAINLEVEL_OUTOF10: 0

## 2022-10-11 NOTE — PROGRESS NOTES
Daily Progress Note  Justina Coyle MD  10/11/2022    Reviewed patient's current plan of care and vital signs with nursing staff. Sleep:  8 hours last night   Attending groups: No but 1 to 1  No reported Suicidal thought; No interaction with peers & staff, out for needs and meals. Mood 9 on a scale of 1 to 10 with 10 is feeling normal.  She denies feeling depressed or anxious. SUBJECTIVE:    Patient is feeling better. SUICIDAL IDEATION denies suicidal ideation. Patient does not have medication side effects. ROS: Patient has new complaints:  No  Sleeping adequately:  Yes  Visitors: No    Mental Status Examination:  Patient is cooperative. Speech: Normal rate and tone. No abnormal movements, tics or mannerisms. Mood euthymic; affect appropriate. Suicidal ideation Absent. Homicidal ideations Absent. Hallucinations Absent. Delusions Absent. Thought Content: normal. Thought Processes: Goal-Directed. Alert and oriented X 3. Attention and concentration fair. MEMORY intact. Insight and Judgement limited. Data   height is 5' 10\" (1.778 m) and weight is 175 lb (79.4 kg). Her oral temperature is 97.7 °F (36.5 °C). Her blood pressure is 112/66 and her pulse is 62. Her respiration is 18 and oxygen saturation is 98%.    Labs:            Medications  Current Facility-Administered Medications: buPROPion (WELLBUTRIN XL) extended release tablet 300 mg, 300 mg, Oral, Daily  bictegravir-emtricitab-tenofovir alafenamide (BIKTARVY) -25 MG per tablet 1 tablet (Patient Supplied), 1 tablet, Oral, Daily  escitalopram (LEXAPRO) tablet 20 mg, 20 mg, Oral, Daily  finasteride (PROSCAR) tablet 5 mg, 5 mg, Oral, Daily  progesterone (PROMETRIUM) capsule 200 mg, 200 mg, Oral, Daily  acetaminophen (TYLENOL) tablet 650 mg, 650 mg, Oral, Q4H PRN  ibuprofen (ADVIL;MOTRIN) tablet 400 mg, 400 mg, Oral, Q6H PRN  polyethylene glycol (GLYCOLAX) packet 17 g, 17 g, Oral, Daily PRN  hydrOXYzine HCl (ATARAX) tablet 50 mg, 50 mg, Oral, TID PRN  traZODone (DESYREL) tablet 50 mg, 50 mg, Oral, Nightly PRN  nicotine (NICODERM CQ) 21 MG/24HR 1 patch, 1 patch, TransDERmal, Daily  ziprasidone (GEODON) 10 mg in sterile water 0.5 mL injection, 10 mg, IntraMUSCular, TID PRN    ASSESSMENT  MDD (major depressive disorder), recurrent severe, [facility]     PLAN  Patient's symptoms are improving  Continue with current medications. Attempt to develop insight  Psycho-education conducted. Supportive Therapy conducted. Probable discharge is today  Follow-up @ [facility].

## 2022-10-11 NOTE — PLAN OF CARE
Problem: Depression  Goal: Will be euthymic at discharge  Description: INTERVENTIONS:  1. Administer medication as ordered  2. Provide emotional support via 1:1 interaction with staff  3. Encourage involvement in milieu/groups/activities  4. Monitor for social isolation  10/10/2022 2204 by Jasmine López LPN  Outcome: Progressing  Note: Pt rates her mood 9/10, has a bright affect and reports hope for the future. 10/10/2022 1158 by Rohini Berrios LPN  Outcome: Progressing     Problem: Psychosis  Goal: Will report no hallucinations or delusions  Description: INTERVENTIONS:  1. Administer medication as  ordered  2. Assist with reality testing to support increasing orientation  3. Assess if patient's hallucinations or delusions are encouraging self harm or harm to others and intervene as appropriate  10/10/2022 2204 by Jasmine López LPN  Outcome: Progressing  Note: Pt denies hallucinations and delusions at this time. 10/10/2022 1158 by Rohini Berrios LPN  Outcome: Progressing     Problem: Anxiety  Goal: Will report anxiety at manageable levels  Description: INTERVENTIONS:  1. Administer medication as ordered  2. Teach and rehearse alternative coping skills  3. Provide emotional support with 1:1 interaction with staff  10/10/2022 2204 by Jasmine López LPN  Outcome: Progressing  Flowsheets (Taken 10/10/2022 1153 by Rohini Berrios LPN)  Will report anxiety at manageable levels:   Administer medication as ordered   Teach and rehearse alternative coping skills   Provide emotional support with 1:1 interaction with staff  Note: Pt denies anxiety at this time, will monitor for changes.    10/10/2022 1158 by Rohini Berrios LPN  Outcome: Progressing  Flowsheets (Taken 10/10/2022 1153)  Will report anxiety at manageable levels:   Administer medication as ordered   Teach and rehearse alternative coping skills   Provide emotional support with 1:1 interaction with staff     Problem: Discharge Planning  Goal: Discharge to home or other facility with appropriate resources  10/10/2022 2204 by Ramon Tsang LPN  Outcome: Progressing  Flowsheets (Taken 10/10/2022 1153 by Leigh Ann Adler LPN)  Discharge to home or other facility with appropriate resources:   Identify barriers to discharge with patient and caregiver   Identify discharge learning needs (meds, wound care, etc)   Arrange for needed discharge resources and transportation as appropriate  Note: Pt is set for probable discharge 10/11/22 and has a follow up appointment scheduled with Sundar Aguilar at [facility] on 10/21/22 at 12 pm   10/10/2022 1158 by Leigh Ann Adler LPN  Outcome: Progressing  Flowsheets (Taken 10/10/2022 1153)  Discharge to home or other facility with appropriate resources:   Identify barriers to discharge with patient and caregiver   Identify discharge learning needs (meds, wound care, etc)   Arrange for needed discharge resources and transportation as appropriate       Care plan reviewed with patient and does verbalize understanding of the plan of care and contribute to goal setting.

## 2022-10-11 NOTE — BH NOTE
This RN has reviewed and agrees with Heavenly Bowling LPN's data collection and has collaborated with this LPN regarding the patient's care plan.

## 2022-10-11 NOTE — DISCHARGE SUMMARY
Physician Discharge Summary     Patient ID:  Valencia Walton  [id number]  68 y.o.  1980    Admit date: 10/7/2022    Discharge date and time: 10/11/2022  9:41 AM     Admitting Physician: Patricia Jarvis MD     Discharge Physician: Eva Lopez MD      Admission Diagnoses: MDD (major depressive disorder), single episode [F32.9]  Depression with suicidal ideation [F32. A, R45.851]    IDENTIFYING INFORMATION: The patient is a 45-year-old single  transgender female. She has no children. She lives with her father. She works at [employer] HISTORY OF PRESENT ILLNESS: The patient presents to [address] [place] [address] ED with her counselor from [facility] due to depression and suicidal  thoughts. She was having plan to shoot herself with a gun and she has  access to a gun at home. She reports that everything in her life  triggered suicidal thoughts. She has a long history of depression and  anxiety. At this time, she has been feeling depressed for about six  months for no specific reason. Her depression keeps getting worse. She  has been having suicidal thoughts for about two weeks. In the emergency  room, she was banging her head against the wall. Also after admission  on this unit, she wanted to hurt herself. She is on one to one. She  reports trouble staying asleep. She feels hopeless and worthless. She  has poor energy, decreased interest in pleasurable activities, and poor  concentration. Besides depression, she reports times when she feels  somewhat happy for no reason, but the symptom may last a few hours to  half a day associated with increased energy. She used to have shopping  spree in the past.  She denies any history of one-night stand. She has  been having those mood swings for the past few years. She may have them  once a week or every two weeks. She reports hearing a voice once  telling her to hurt herself, sometime last year or early this year.   She  feels anxious most of the time and she worries a lot. She has a history  of anxiety attacks, last time was in July. She denies history of abuse  or trauma. She also denies eating disorder symptoms and obsessive  compulsive disorder symptoms. MENTAL STATUS EXAMINATION AT ADMISSION: See H and P. Discharge Diagnoses:   MDD (major depressive disorder), recurrent severe, without psychosis ([facility] [address]     Past Medical History:   Diagnosis Date    Depressed     Panic disorder with agoraphobia     Fear of crowded spaces. Admission Condition: poor    Discharged Condition: stable    Indication for Admission: threat to self    Significant Diagnostic Studies:   See Results Review tab in EHR      TREATMENT AND CLINICAL COURSE:   Patient was admitted on the unit. Routine lab was ordered. Physical examination was within normal limits. At admission, I resumed home medications but increase escitalopram to 20 mg daily; Hydroxyzine & Trazodone were added. Later I increased bupropion XL due to depressive symptoms. Patient did not have side effect from medications. Patient was involved some in milieu but not in group therapy. She was on one-to-one due to suicidal thoughts and impulsive behavior. We discussed about smoking cessation. Toward the end of the hospital stay, patient become more hopeful. Overall, hospital stay was uncomplicated, and patient was discharged in stable condition. Patient wanted to discharge early since we did not have her HIV medication on formulary and she had no one to bring it for her. Consults: none    Treatments: Psychotropic medications, therapy with group, milieu, and 1:1 with nurses, social workers and Attending physician.       Discharge Medications:  Current Discharge Medication List        START taking these medications    Details   hydrOXYzine HCl (ATARAX) 50 MG tablet Take 1 tablet by mouth 3 times daily as needed for Anxiety  Qty: 90 tablet, Refills: 0           CONTINUE these medications which have CHANGED    Details buPROPion (WELLBUTRIN XL) 300 MG extended release tablet Take 1 tablet by mouth daily  Qty: 30 tablet, Refills: 0      escitalopram (LEXAPRO) 20 MG tablet Take 1 tablet by mouth daily  Qty: 30 tablet, Refills: 0      traZODone (DESYREL) 50 MG tablet Take 1 tablet by mouth nightly as needed for Sleep  Qty: 30 tablet, Refills: 0           CONTINUE these medications which have NOT CHANGED    Details   finasteride (PROSCAR) 5 MG tablet Take 5 mg by mouth daily      progesterone (PROMETRIUM) 200 MG CAPS capsule Take 200 mg by mouth daily      BIKTARVY -25 MG TABS per tablet TK 1 T PO QD    Associated Diagnoses: HIV infection, unspecified symptom status (HCC)           STOP taking these medications       vitamin D 25 MCG (1000 UT) CAPS Comments:   Reason for Stopping:         UNABLE TO FIND Comments:   Reason for Stopping:         hydrOXYzine (VISTARIL) 50 MG capsule Comments:   Reason for Stopping:         Cholecalciferol (VITAMIN D) 50 MCG (2000 UT) TABS tablet Comments:   Reason for Stopping:         magnesium 200 MG TABS tablet Comments:   Reason for Stopping:         folic acid (FOLVITE) 1 MG tablet Comments:   Reason for Stopping:         Multiple Vitamin (MULTIVITAMIN) tablet Comments:   Reason for Stopping:         vitamin B-1 100 MG tablet Comments:   Reason for Stopping:                  MENTAL STATUS EXAMINATION AT DISCHARGE: Patient is cooperative. Speech: Normal rate and tone. No abnormal movements, tics or mannerisms. Mood euthymic; affect appropriate. Suicidal ideation Absent. Homicidal ideations Absent. Hallucinations Absent. Delusions Absent. Thought Content: normal. Thought Processes: Goal-Directed. Alert and oriented X 3. Attention and concentration fair. MEMORY intact. Insight and Judgement limited. Disposition: Home    Patient Instructions: Activity: As tolerated  Diet: regular diet    Follow-up as scheduled with [facility].       Time Spent on discharge in examination, evaluation, counseling and review of medications and discharge plan: More than 30 minutes. Engagement: Patient displayed a good level of engagement with the treatments offered during this admission.        Signed:  Electronically signed by Rose Ovalles MD on 10/11/2022 at 9:41 AM

## 2022-10-11 NOTE — BH NOTE
Behavioral Health   Discharge Note    Pt discharged with followings belongings:   Dental Appliances: None  Vision - Corrective Lenses: None  Hearing Aid: None  Jewelry: None  Body Piercings Removed: N/A  Clothing: Other (Comment)  Other Valuables: Other (Comment) (See note from [facility])   Valuables retrieved from safe, security envelope number:  n/a and returned to patient. Patient left department with staff via family . Discharged to home. \"An Important Message from Medicare About Your Rights\" (IMM) form photocopy original from admission and provided to pt at least 4 hours prior to discharge N/A. If pt left within 4 hours of receiving 2nd delivery of IMM, this is because pt was agreeable with hospital discharge. Patient/guardian education on aftercare instructions: Yes  Bridge appointment completed:  yes. Reviewed Discharge Instructions with patient/family/nursing facility. Patient/family verbalizes understanding and agreement with the discharge plan using the teachback method. Information faxed to n/a by n/a Patient/family verbalize understanding of AVS:Yes    Status EXAM upon discharge:  Mental Status and Behavioral Exam  Normal: No  Level of Assistance: Independent/Self  Facial Expression: Brightened  Affect: Appropriate  Level of Consciousness: Alert  Frequency of Checks: 4 times per hour, close  Mood:Normal: Yes  Mood: Empty  Motor Activity:Normal: Yes  Motor Activity: Decreased  Eye Contact: Fair  Observed Behavior: Cooperative  Sexual Misconduct History: Current - no  Preception: [place] to person, [place] to time, [place] to place, [place] to situation  Attention:Normal: Yes  Attention: Distractible  Thought Processes: Circumstantial  Thought Content:Normal: Yes  Thought Content: Paranoia (Feels the whole universe is against her.)  Depression Symptoms: No problems reported or observed. Anxiety Symptoms: No problems reported or observed. Yandy Symptoms: No problems reported or observed.   Hallucinations: None  Delusions: No  Delusions: Paranoid  Memory:Normal: Yes  Memory: Poor recent  Insight and Judgment: No  Insight and Judgment: Poor judgment, Poor insight    Roland Sprague LPN

## 2022-10-11 NOTE — DISCHARGE INSTRUCTIONS
[employer] Hotline:  [phone]    Crisis phone numbers:  Talia Quinteros, and .S. [facility] GUARD [facility] [phone]. Christoph Mcguire, and [facility] [address] [facility] [phone]. [employer] [facility] [phone]. [address] Nieves English [place], and [place] [phone]. [facility]  [address] [facility], [address] [facility] [address]  [address] [facility] Sr.[employer]  [facility] [address]  [address]  [place], [address]  [place], [address]  [place] [address]    [facility] LESLEY MCKEON  N[address]  [address] [facility] 22[address] [facility], [address]  [address] [facility]  Recovery and [address]  [address]  [phone]    [facility]  [address] [facility],[address], [address]  [address] [facility] [address]  [place], [address] [facility] [address] [facility] [address]  [facility]  [address]  [address], [address] [facility]  Recovery and [address]  [address], [address] [place]  [phone]    [facility] PSYCHIATRIC [place]  [address], [address]  [address]  1 [facility] [place],5Th Floor [facility], [facility] 799  [address] [facility]  [address]  [place], [address]  [address]  [address]  [address], [address] [facility] [address]  [phone]

## 2022-10-11 NOTE — DISCHARGE INSTR - DIET

## 2022-10-12 ENCOUNTER — TELEPHONE (OUTPATIENT)
Dept: PSYCHIATRY | Age: 42
End: 2022-10-12

## 2022-10-12 NOTE — TELEPHONE ENCOUNTER
[employer]  Discharge Call Back Program. Called patient. Patient reported that there were no issues with their discharge.